# Patient Record
Sex: FEMALE | Race: WHITE | HISPANIC OR LATINO | Employment: FULL TIME | URBAN - METROPOLITAN AREA
[De-identification: names, ages, dates, MRNs, and addresses within clinical notes are randomized per-mention and may not be internally consistent; named-entity substitution may affect disease eponyms.]

---

## 2018-02-20 ENCOUNTER — OFFICE VISIT - HEALTHEAST (OUTPATIENT)
Dept: FAMILY MEDICINE | Facility: CLINIC | Age: 23
End: 2018-02-20

## 2018-02-20 DIAGNOSIS — N92.6 IRREGULAR MENSTRUAL BLEEDING: ICD-10-CM

## 2018-02-20 DIAGNOSIS — Z00.00 ROUTINE GENERAL MEDICAL EXAMINATION AT A HEALTH CARE FACILITY: ICD-10-CM

## 2018-02-20 DIAGNOSIS — Z83.3 FAMILY HISTORY OF DIABETES MELLITUS IN FATHER: ICD-10-CM

## 2018-02-20 ASSESSMENT — MIFFLIN-ST. JEOR: SCORE: 1783.27

## 2018-05-02 ENCOUNTER — COMMUNICATION - HEALTHEAST (OUTPATIENT)
Dept: FAMILY MEDICINE | Facility: CLINIC | Age: 23
End: 2018-05-02

## 2018-05-02 DIAGNOSIS — Z30.9 CONTRACEPTION MANAGEMENT: ICD-10-CM

## 2018-08-06 ENCOUNTER — COMMUNICATION - HEALTHEAST (OUTPATIENT)
Dept: FAMILY MEDICINE | Facility: CLINIC | Age: 23
End: 2018-08-06

## 2018-08-06 DIAGNOSIS — Z30.9 CONTRACEPTION MANAGEMENT: ICD-10-CM

## 2018-08-16 ENCOUNTER — COMMUNICATION - HEALTHEAST (OUTPATIENT)
Dept: SCHEDULING | Facility: CLINIC | Age: 23
End: 2018-08-16

## 2018-08-16 DIAGNOSIS — Z30.9 CONTRACEPTION MANAGEMENT: ICD-10-CM

## 2018-09-28 ENCOUNTER — RECORDS - HEALTHEAST (OUTPATIENT)
Dept: ADMINISTRATIVE | Facility: OTHER | Age: 23
End: 2018-09-28

## 2018-09-28 LAB
CHLAMYDIA_EXT- HISTORICAL: NEGATIVE
SPECIMEN DESCRIPTION_EXT (HISTORICAL CONVERSION): NORMAL

## 2018-12-10 ENCOUNTER — OFFICE VISIT - HEALTHEAST (OUTPATIENT)
Dept: FAMILY MEDICINE | Facility: CLINIC | Age: 23
End: 2018-12-10

## 2018-12-10 DIAGNOSIS — B96.89 BACTERIAL VAGINOSIS: ICD-10-CM

## 2018-12-10 DIAGNOSIS — A60.04 HERPES SIMPLEX VULVOVAGINITIS: ICD-10-CM

## 2018-12-10 DIAGNOSIS — R30.0 DYSURIA: ICD-10-CM

## 2018-12-10 DIAGNOSIS — N76.0 BACTERIAL VAGINOSIS: ICD-10-CM

## 2018-12-10 LAB
ALBUMIN UR-MCNC: ABNORMAL MG/DL
APPEARANCE UR: CLEAR
BACTERIA #/AREA URNS HPF: ABNORMAL HPF
BILIRUB UR QL STRIP: NEGATIVE
CLUE CELLS: ABNORMAL
COLOR UR AUTO: YELLOW
GLUCOSE UR STRIP-MCNC: NEGATIVE MG/DL
HGB UR QL STRIP: ABNORMAL
KETONES UR STRIP-MCNC: NEGATIVE MG/DL
LEUKOCYTE ESTERASE UR QL STRIP: ABNORMAL
NITRATE UR QL: NEGATIVE
PH UR STRIP: 6.5 [PH] (ref 5–8)
RBC #/AREA URNS AUTO: ABNORMAL HPF
SP GR UR STRIP: >=1.03 (ref 1–1.03)
SQUAMOUS #/AREA URNS AUTO: ABNORMAL LPF
TRICHOMONAS, WET PREP: ABNORMAL
UROBILINOGEN UR STRIP-ACNC: ABNORMAL
WBC #/AREA URNS AUTO: ABNORMAL HPF
YEAST, WET PREP: ABNORMAL

## 2018-12-11 LAB
BACTERIA SPEC CULT: NO GROWTH
HSV SPECIMEN: ABNORMAL
HSV1 DNA SPEC QL NAA+PROBE: POSITIVE
HSV2 DNA SPEC QL NAA+PROBE: NEGATIVE

## 2018-12-12 LAB
C TRACH DNA SPEC QL PROBE+SIG AMP: NEGATIVE
N GONORRHOEA DNA SPEC QL NAA+PROBE: NEGATIVE

## 2018-12-28 ENCOUNTER — COMMUNICATION - HEALTHEAST (OUTPATIENT)
Dept: FAMILY MEDICINE | Facility: CLINIC | Age: 23
End: 2018-12-28

## 2018-12-28 DIAGNOSIS — Z30.9 CONTRACEPTION MANAGEMENT: ICD-10-CM

## 2019-03-28 ENCOUNTER — COMMUNICATION - HEALTHEAST (OUTPATIENT)
Dept: FAMILY MEDICINE | Facility: CLINIC | Age: 24
End: 2019-03-28

## 2019-03-28 DIAGNOSIS — Z30.9 CONTRACEPTION MANAGEMENT: ICD-10-CM

## 2019-04-02 ENCOUNTER — COMMUNICATION - HEALTHEAST (OUTPATIENT)
Dept: FAMILY MEDICINE | Facility: CLINIC | Age: 24
End: 2019-04-02

## 2019-04-02 DIAGNOSIS — Z30.9 CONTRACEPTION MANAGEMENT: ICD-10-CM

## 2019-04-10 ENCOUNTER — RECORDS - HEALTHEAST (OUTPATIENT)
Dept: ADMINISTRATIVE | Facility: OTHER | Age: 24
End: 2019-04-10

## 2019-04-10 LAB
CHLAMYDIA_EXT- HISTORICAL: NEGATIVE
PAP SMEAR - HIM PATIENT REPORTED: NORMAL
SPECIMEN DESCRIPTION_EXT (HISTORICAL CONVERSION): NORMAL

## 2019-05-07 ENCOUNTER — OFFICE VISIT - HEALTHEAST (OUTPATIENT)
Dept: FAMILY MEDICINE | Facility: CLINIC | Age: 24
End: 2019-05-07

## 2019-05-07 DIAGNOSIS — N94.6 DYSMENORRHEA: ICD-10-CM

## 2019-05-07 DIAGNOSIS — Z30.9 CONTRACEPTION MANAGEMENT: ICD-10-CM

## 2019-05-07 DIAGNOSIS — Z13.1 SCREENING FOR DIABETES MELLITUS: ICD-10-CM

## 2019-05-07 DIAGNOSIS — Z11.3 SCREEN FOR STD (SEXUALLY TRANSMITTED DISEASE): ICD-10-CM

## 2019-05-07 DIAGNOSIS — Z00.00 ROUTINE GENERAL MEDICAL EXAMINATION AT A HEALTH CARE FACILITY: ICD-10-CM

## 2019-05-07 LAB — HBA1C MFR BLD: 5.1 % (ref 3.5–6)

## 2019-05-07 ASSESSMENT — MIFFLIN-ST. JEOR: SCORE: 1869.45

## 2019-05-08 LAB
C TRACH DNA SPEC QL PROBE+SIG AMP: NEGATIVE
N GONORRHOEA DNA SPEC QL NAA+PROBE: NEGATIVE

## 2019-05-09 ENCOUNTER — RECORDS - HEALTHEAST (OUTPATIENT)
Dept: ADMINISTRATIVE | Facility: OTHER | Age: 24
End: 2019-05-09

## 2019-05-13 ENCOUNTER — RECORDS - HEALTHEAST (OUTPATIENT)
Dept: HEALTH INFORMATION MANAGEMENT | Facility: CLINIC | Age: 24
End: 2019-05-13

## 2019-10-22 ENCOUNTER — COMMUNICATION - HEALTHEAST (OUTPATIENT)
Dept: FAMILY MEDICINE | Facility: CLINIC | Age: 24
End: 2019-10-22

## 2019-11-11 ENCOUNTER — OFFICE VISIT - HEALTHEAST (OUTPATIENT)
Dept: FAMILY MEDICINE | Facility: CLINIC | Age: 24
End: 2019-11-11

## 2019-11-11 DIAGNOSIS — F33.1 MODERATE EPISODE OF RECURRENT MAJOR DEPRESSIVE DISORDER (H): ICD-10-CM

## 2019-11-11 DIAGNOSIS — F32.81 PMDD (PREMENSTRUAL DYSPHORIC DISORDER): ICD-10-CM

## 2019-11-11 ASSESSMENT — ANXIETY QUESTIONNAIRES
GAD7 TOTAL SCORE: 15
6. BECOMING EASILY ANNOYED OR IRRITABLE: MORE THAN HALF THE DAYS
3. WORRYING TOO MUCH ABOUT DIFFERENT THINGS: NEARLY EVERY DAY
5. BEING SO RESTLESS THAT IT IS HARD TO SIT STILL: MORE THAN HALF THE DAYS
IF YOU CHECKED OFF ANY PROBLEMS ON THIS QUESTIONNAIRE, HOW DIFFICULT HAVE THESE PROBLEMS MADE IT FOR YOU TO DO YOUR WORK, TAKE CARE OF THINGS AT HOME, OR GET ALONG WITH OTHER PEOPLE: VERY DIFFICULT
7. FEELING AFRAID AS IF SOMETHING AWFUL MIGHT HAPPEN: SEVERAL DAYS
1. FEELING NERVOUS, ANXIOUS, OR ON EDGE: MORE THAN HALF THE DAYS
4. TROUBLE RELAXING: NEARLY EVERY DAY
2. NOT BEING ABLE TO STOP OR CONTROL WORRYING: MORE THAN HALF THE DAYS

## 2019-11-11 ASSESSMENT — PATIENT HEALTH QUESTIONNAIRE - PHQ9: SUM OF ALL RESPONSES TO PHQ QUESTIONS 1-9: 24

## 2019-12-03 ENCOUNTER — COMMUNICATION - HEALTHEAST (OUTPATIENT)
Dept: FAMILY MEDICINE | Facility: CLINIC | Age: 24
End: 2019-12-03

## 2019-12-03 DIAGNOSIS — F33.1 MODERATE EPISODE OF RECURRENT MAJOR DEPRESSIVE DISORDER (H): ICD-10-CM

## 2019-12-18 ENCOUNTER — OFFICE VISIT - HEALTHEAST (OUTPATIENT)
Dept: FAMILY MEDICINE | Facility: CLINIC | Age: 24
End: 2019-12-18

## 2019-12-18 DIAGNOSIS — F33.1 MODERATE EPISODE OF RECURRENT MAJOR DEPRESSIVE DISORDER (H): ICD-10-CM

## 2019-12-18 DIAGNOSIS — F32.81 PMDD (PREMENSTRUAL DYSPHORIC DISORDER): ICD-10-CM

## 2019-12-18 ASSESSMENT — ANXIETY QUESTIONNAIRES
4. TROUBLE RELAXING: NOT AT ALL
5. BEING SO RESTLESS THAT IT IS HARD TO SIT STILL: SEVERAL DAYS
IF YOU CHECKED OFF ANY PROBLEMS ON THIS QUESTIONNAIRE, HOW DIFFICULT HAVE THESE PROBLEMS MADE IT FOR YOU TO DO YOUR WORK, TAKE CARE OF THINGS AT HOME, OR GET ALONG WITH OTHER PEOPLE: SOMEWHAT DIFFICULT
6. BECOMING EASILY ANNOYED OR IRRITABLE: NOT AT ALL
GAD7 TOTAL SCORE: 1
7. FEELING AFRAID AS IF SOMETHING AWFUL MIGHT HAPPEN: NOT AT ALL
2. NOT BEING ABLE TO STOP OR CONTROL WORRYING: NOT AT ALL
1. FEELING NERVOUS, ANXIOUS, OR ON EDGE: NOT AT ALL
3. WORRYING TOO MUCH ABOUT DIFFERENT THINGS: NOT AT ALL

## 2019-12-18 ASSESSMENT — PATIENT HEALTH QUESTIONNAIRE - PHQ9: SUM OF ALL RESPONSES TO PHQ QUESTIONS 1-9: 3

## 2020-01-14 ENCOUNTER — COMMUNICATION - HEALTHEAST (OUTPATIENT)
Dept: FAMILY MEDICINE | Facility: CLINIC | Age: 25
End: 2020-01-14

## 2020-01-14 DIAGNOSIS — Z30.9 CONTRACEPTION MANAGEMENT: ICD-10-CM

## 2020-04-13 ENCOUNTER — COMMUNICATION - HEALTHEAST (OUTPATIENT)
Dept: FAMILY MEDICINE | Facility: CLINIC | Age: 25
End: 2020-04-13

## 2020-04-13 DIAGNOSIS — Z30.9 CONTRACEPTION MANAGEMENT: ICD-10-CM

## 2020-05-15 ENCOUNTER — VIRTUAL VISIT (OUTPATIENT)
Dept: FAMILY MEDICINE | Facility: OTHER | Age: 25
End: 2020-05-15

## 2020-05-15 ENCOUNTER — AMBULATORY - HEALTHEAST (OUTPATIENT)
Dept: FAMILY MEDICINE | Facility: CLINIC | Age: 25
End: 2020-05-15

## 2020-05-15 ENCOUNTER — OFFICE VISIT - HEALTHEAST (OUTPATIENT)
Dept: INTERNAL MEDICINE | Facility: CLINIC | Age: 25
End: 2020-05-15

## 2020-05-15 DIAGNOSIS — Z20.822 SUSPECTED COVID-19 VIRUS INFECTION: ICD-10-CM

## 2020-05-15 NOTE — PROGRESS NOTES
"Date: 05/15/2020 12:52:56  Clinician: Savana Camacho  Clinician NPI: [id number]  Patient: Abbea Toro  Patient : 1995  Patient Address: [address] [place], [place] [id number]  Patient Phone: [phone]  Visit Protocol: URI  Patient Summary:  Abeba is a 24 year old ( : 1995 ) female who initiated a Visit for COVID-19 (Coronavirus) evaluation and screening. When asked the question \"Please sign me up to receive news, health information and promotions. \", Abeba responded \"No\".    Abeba states her symptoms started 1-2 days ago.   Her symptoms consist of a cough, malaise, and myalgia. Abeba also feels feverish but was unable to measure her temperature.   Symptom details   Cough: Abeba coughs a few times an hour and her cough is more bothersome at night. Phlegm does not come into her throat when she coughs. She does not believe her cough is caused by post-nasal drip.    Abeba denies having nasal congestion, vomiting, nausea, teeth pain, ageusia, diarrhea, facial pain or pressure, chills, ear pain, headache, rhinitis, anosmia, sore throat, and wheezing. She also denies taking antibiotic medication for the symptoms and having recent facial or sinus surgery in the past 60 days. She is not experiencing dyspnea.   Precipitating events  She has not recently been exposed to someone with influenza. Abeba has not been in close contact with any high risk individuals.   Pertinent COVID-19 (Coronavirus) information  In the past 14 days, Abeba has not worked in a congregate living setting.   She does not work or volunteer as healthcare worker or a  and does not work or volunteer in a healthcare facility.   Abeba also has not lived in a congregate living setting in the past 14 days. She lives with a healthcare worker.   Abeba has not had a close contact with a laboratory-confirmed COVID-19 patient within 14 days of symptom onset.   Pertinent medical history  Abeba " typically gets a yeast infection when she takes antibiotics. She is not sure if she has used fluconazole (Diflucan) to treat previous yeast infections.   Abeba does not need a return to work/school note.   Weight: 240 lbs   Abeba does not smoke or use smokeless tobacco.   She denies pregnancy and denies breastfeeding. Her last period was over a month ago.   Additional information as reported by the patient (free text): The person who I am living with has all the symptoms and has been exposed to several people with COVID.   Weight: 240 lbs    MEDICATIONS: Lexapro oral, sertraline oral, ALLERGIES: NKDA  Clinician Response:  Dear Abeba,   Dear Abeba  Your symptoms show that you may have coronavirus (COVID-19). This illness can cause fever, cough and trouble breathing. Many people get a mild case and get better on their own. Some people can get very sick.  What should I do?  We would like to test you for this virus. This will be a curbside test done outside the clinic.  Please call [phone] to schedule your visit. Explain that you were referred by [facility] to have a COVID-19 test. Be ready to share your [facility] visit ID number.  Starting now:  Stay at least 6 feet away from others. (If someone will drive you to your test, stay in the backseat, as far away from the  as you can.)   Don't go to work, school or anywhere else. When it's time for your test, go straight to the testing site. Don't make any stops on the way there or back.   Wash your hands and face often. Use soap and water.   Cover your mouth and nose with a mask, tissue or washcloth.   Don't touch anyone. No hugging, kissing or handshakes.  While at home   Stay home and away from others (self-isolate) until:  You've had no fever---and no medicine that reduces fever---for 3 full days (72 hours). And...  Your other symptoms have gotten better. For example, your cough or breathing has improved. And...  At least 10 days have passed since your  "symptoms started.  During this time:  Stay in your own room (and use your own bathroom), if you can.  Don't go to work, school or anywhere else.  Stay away from others in your home. No hugging, kissing or shaking hands.  Don't let anyone visit.  Cover your mouth and nose with a mask, tissue or washcloth to avoid spreading germs.  Clean \"high touch\" surfaces often (doorknobs, counters, handles, etc.). Use a household cleaning spray or wipes.  Wash your hands and face often. Use soap and water.  How can I take care of myself?  1. Get lots of rest. Drink extra fluids (unless your doctor has told you not to).  2. Take Tylenol (acetaminophen) for fever or pain. If you have liver or kidney problems, ask your family doctor if it's okay to take Tylenol.  Adults can take either:   650 mg (two 325 mg pills) every 4 to 6 hours, or...  1,000 mg (two 500 mg pills) every 8 hours as needed.   Note: Don't take more than 3,000 mg in one day.   Acetaminophen is found in many medicines (both prescribed and over-the-counter medicines). Read all labels to be sure you don't take too much.   For children, check the Tylenol bottle for the right dose. The dose is based on the child's age or weight.  3. If you have other health problems (like cancer, heart failure, an organ transplant or severe kidney disease): Call your specialty clinic if you don't feel better in the next 2 days.  4. Know when to call 911: If your breathing is so bad that it keeps you from doing normal activities, call 911 or go to the emergency room. Tell them that you've been staying home and may have COVID-19.  5. Sign up for [employer]. We know it's scary to hear that you might have COVID-19. We want to track your symptoms to make sure you're okay over the next 2 weeks. Please look for an email from [employer]---this is a free, online program that we'll use to keep in touch. To sign up, follow the link in the email. Learn more at [email or web address].  " 6. The following will serve as your written order for this Covid Test ordered by me for the indication of suspected Covid [Z20.828]: The test will be ordered in [employer] our electronic health record after you are scheduled and will show as ordered and authorized by Sergio Greene MD   Order: Covid-19 (Coronavirus) PCR for SYMPTOMATIC testing from [facility]  Where can I get more information?  To learn more about COVID-19 and how to care for yourself at home, please visit the CDC website at https://www.cdc.gov/coronavirus/2019-ncov/about/steps-when-sick.html.  For more about your care at [facility], please visit [email or web address]  If you'd like to be part of a COVID-19 clinical trial (research study) at the [facility], go to [email or web address] for details.    Diagnosis: Cough  Diagnosis ICD: R05

## 2020-05-17 ENCOUNTER — COMMUNICATION - HEALTHEAST (OUTPATIENT)
Dept: INTERNAL MEDICINE | Facility: CLINIC | Age: 25
End: 2020-05-17

## 2020-05-20 ENCOUNTER — AMBULATORY - HEALTHEAST (OUTPATIENT)
Dept: FAMILY MEDICINE | Facility: CLINIC | Age: 25
End: 2020-05-20

## 2020-05-20 ENCOUNTER — COMMUNICATION - HEALTHEAST (OUTPATIENT)
Dept: SCHEDULING | Facility: CLINIC | Age: 25
End: 2020-05-20

## 2020-05-20 ENCOUNTER — OFFICE VISIT - HEALTHEAST (OUTPATIENT)
Dept: FAMILY MEDICINE | Facility: CLINIC | Age: 25
End: 2020-05-20

## 2020-05-20 ENCOUNTER — OFFICE VISIT - HEALTHEAST (OUTPATIENT)
Dept: INTERNAL MEDICINE | Facility: CLINIC | Age: 25
End: 2020-05-20

## 2020-05-20 ENCOUNTER — TELEPHONE (OUTPATIENT)
Dept: URGENT CARE | Facility: CLINIC | Age: 25
End: 2020-05-20

## 2020-05-20 ENCOUNTER — COMMUNICATION - HEALTHEAST (OUTPATIENT)
Dept: INTERNAL MEDICINE | Facility: CLINIC | Age: 25
End: 2020-05-20

## 2020-05-20 DIAGNOSIS — Z20.828 EXPOSURE TO SARS-ASSOCIATED CORONAVIRUS: ICD-10-CM

## 2020-05-20 DIAGNOSIS — Z20.822 SUSPECTED COVID-19 VIRUS INFECTION: ICD-10-CM

## 2020-05-20 DIAGNOSIS — Z20.822 EXPOSURE TO COVID-19 VIRUS: Primary | ICD-10-CM

## 2020-05-20 DIAGNOSIS — R05.9 COUGH: ICD-10-CM

## 2020-05-20 NOTE — TELEPHONE ENCOUNTER
Abeba Toro is being monitored on the [facility] GetWell Loop for patients with symptoms of COVID-19.    I have referred this patient to an [facility] for evaluation. When the patient calls, please allow them to schedule at urgent care.    Reason for referral: worsening SOB    Abeba's symtoms began on 5/14, was seen virtually on 5/15, PCR test on 5/15 was negative.  Sx history: 5/14 got dry cough.  Starting 5/18, also started having throat pain and difficulty swallowing, SOB, and diarrhea.  The SOB affects her when walking up stairs (3rd floor apartment), doing dishes, or even just stretching, it is difficult for her to catch her breath, feels she has to breath faster through her mouth.    Pt has no history of asthma or allergies, no other medical conditions.    Lives with partner, he was exposed at work and his sx started 5/13, his PCR test on 5/16 was positive, he continues to have symptoms but managed at home.    We ordered a second PCR test for her and recommended she be seen in urgent care to assess worsening SOB.  Patient ok with this plan, was given [address] [place] [address] to call and schedule.      Guera Cornejo, MS4  Working with Dr. Desmond Carlisle MD  5/20/2020 10:43 AM

## 2020-05-20 NOTE — TELEPHONE ENCOUNTER
I reviewed the telephone visit encounter and discussed the patient with the medical student and agree with the provider's assessment and plan of care as documented.  Would recommend retesting due to continued symptoms.  Desmond Carlisle MD

## 2020-05-22 ENCOUNTER — COMMUNICATION - HEALTHEAST (OUTPATIENT)
Dept: INTERNAL MEDICINE | Facility: CLINIC | Age: 25
End: 2020-05-22

## 2020-09-06 ENCOUNTER — COMMUNICATION - HEALTHEAST (OUTPATIENT)
Dept: FAMILY MEDICINE | Facility: CLINIC | Age: 25
End: 2020-09-06

## 2020-09-06 DIAGNOSIS — F33.1 MODERATE EPISODE OF RECURRENT MAJOR DEPRESSIVE DISORDER (H): ICD-10-CM

## 2020-09-30 DIAGNOSIS — Z11.59 SCREENING FOR VIRAL DISEASE: ICD-10-CM

## 2020-12-22 ENCOUNTER — COMMUNICATION - HEALTHEAST (OUTPATIENT)
Dept: FAMILY MEDICINE | Facility: CLINIC | Age: 25
End: 2020-12-22

## 2020-12-22 DIAGNOSIS — Z30.9 CONTRACEPTION MANAGEMENT: ICD-10-CM

## 2021-02-25 ENCOUNTER — OFFICE VISIT - HEALTHEAST (OUTPATIENT)
Dept: FAMILY MEDICINE | Facility: CLINIC | Age: 26
End: 2021-02-25

## 2021-02-25 DIAGNOSIS — E66.01 MORBID OBESITY (H): ICD-10-CM

## 2021-02-25 DIAGNOSIS — Z00.00 ROUTINE GENERAL MEDICAL EXAMINATION AT A HEALTH CARE FACILITY: ICD-10-CM

## 2021-02-25 DIAGNOSIS — F33.1 MODERATE EPISODE OF RECURRENT MAJOR DEPRESSIVE DISORDER (H): ICD-10-CM

## 2021-02-25 DIAGNOSIS — Z30.9 CONTRACEPTION MANAGEMENT: ICD-10-CM

## 2021-02-25 LAB — HBA1C MFR BLD: 5.3 %

## 2021-02-25 RX ORDER — LEVONORGESTREL AND ETHINYL ESTRADIOL 0.15-0.03
1 KIT ORAL DAILY
Qty: 3 PACKAGE | Refills: 3 | Status: SHIPPED | OUTPATIENT
Start: 2021-02-25 | End: 2022-01-09

## 2021-02-25 ASSESSMENT — ANXIETY QUESTIONNAIRES
2. NOT BEING ABLE TO STOP OR CONTROL WORRYING: NOT AT ALL
6. BECOMING EASILY ANNOYED OR IRRITABLE: MORE THAN HALF THE DAYS
7. FEELING AFRAID AS IF SOMETHING AWFUL MIGHT HAPPEN: MORE THAN HALF THE DAYS
5. BEING SO RESTLESS THAT IT IS HARD TO SIT STILL: SEVERAL DAYS
1. FEELING NERVOUS, ANXIOUS, OR ON EDGE: MORE THAN HALF THE DAYS
IF YOU CHECKED OFF ANY PROBLEMS ON THIS QUESTIONNAIRE, HOW DIFFICULT HAVE THESE PROBLEMS MADE IT FOR YOU TO DO YOUR WORK, TAKE CARE OF THINGS AT HOME, OR GET ALONG WITH OTHER PEOPLE: SOMEWHAT DIFFICULT
4. TROUBLE RELAXING: NEARLY EVERY DAY
3. WORRYING TOO MUCH ABOUT DIFFERENT THINGS: MORE THAN HALF THE DAYS
GAD7 TOTAL SCORE: 12

## 2021-02-25 ASSESSMENT — PATIENT HEALTH QUESTIONNAIRE - PHQ9: SUM OF ALL RESPONSES TO PHQ QUESTIONS 1-9: 23

## 2021-02-25 ASSESSMENT — MIFFLIN-ST. JEOR: SCORE: 2005.53

## 2021-04-05 ENCOUNTER — OFFICE VISIT - HEALTHEAST (OUTPATIENT)
Dept: FAMILY MEDICINE | Facility: CLINIC | Age: 26
End: 2021-04-05

## 2021-04-05 DIAGNOSIS — F33.1 MODERATE EPISODE OF RECURRENT MAJOR DEPRESSIVE DISORDER (H): ICD-10-CM

## 2021-04-05 ASSESSMENT — PATIENT HEALTH QUESTIONNAIRE - PHQ9: SUM OF ALL RESPONSES TO PHQ QUESTIONS 1-9: 14

## 2021-05-03 ENCOUNTER — OFFICE VISIT - HEALTHEAST (OUTPATIENT)
Dept: FAMILY MEDICINE | Facility: CLINIC | Age: 26
End: 2021-05-03

## 2021-05-03 DIAGNOSIS — F33.1 MODERATE EPISODE OF RECURRENT MAJOR DEPRESSIVE DISORDER (H): ICD-10-CM

## 2021-05-03 RX ORDER — BUPROPION HYDROCHLORIDE 300 MG/1
300 TABLET ORAL DAILY
Qty: 90 TABLET | Refills: 3 | Status: SHIPPED | OUTPATIENT
Start: 2021-05-03 | End: 2022-01-09

## 2021-05-03 ASSESSMENT — PATIENT HEALTH QUESTIONNAIRE - PHQ9: SUM OF ALL RESPONSES TO PHQ QUESTIONS 1-9: 2

## 2021-05-19 ENCOUNTER — COMMUNICATION - HEALTHEAST (OUTPATIENT)
Dept: FAMILY MEDICINE | Facility: CLINIC | Age: 26
End: 2021-05-19

## 2021-05-19 DIAGNOSIS — F33.1 MODERATE EPISODE OF RECURRENT MAJOR DEPRESSIVE DISORDER (H): ICD-10-CM

## 2021-05-26 ASSESSMENT — PATIENT HEALTH QUESTIONNAIRE - PHQ9
SUM OF ALL RESPONSES TO PHQ QUESTIONS 1-9: 3
SUM OF ALL RESPONSES TO PHQ QUESTIONS 1-9: 24

## 2021-05-27 VITALS
RESPIRATION RATE: 14 BRPM | HEART RATE: 68 BPM | DIASTOLIC BLOOD PRESSURE: 78 MMHG | OXYGEN SATURATION: 98 % | SYSTOLIC BLOOD PRESSURE: 120 MMHG | TEMPERATURE: 98.5 F

## 2021-05-27 ASSESSMENT — PATIENT HEALTH QUESTIONNAIRE - PHQ9
SUM OF ALL RESPONSES TO PHQ QUESTIONS 1-9: 23
SUM OF ALL RESPONSES TO PHQ QUESTIONS 1-9: 2
SUM OF ALL RESPONSES TO PHQ QUESTIONS 1-9: 14

## 2021-05-27 NOTE — TELEPHONE ENCOUNTER
RN cannot approve Refill Request    RN can NOT refill this medication PCP messaged that patient is overdue for Office Visit and Physical .       Lilian Saucedo, [facility] Triage/Med Refill 4/3/2019    Requested Prescriptions   Pending Prescriptions Disp Refills     levonorgestrel-ethinyl estradiol (AVIANE,ALESSE,LESSINA) 0.1-20 mg-mcg per tablet 1 Package 0     Sig: Take 1 tablet by mouth daily.    Oral Contraceptives Protocol Failed - 4/2/2019 11:17 AM       Failed - Visit with PCP or prescribing provider visit in last 12 months     Last office visit with prescriber/PCP: Visit date not found OR same dept: Visit date not found OR same specialty: Visit date not found  Last physical: Visit date not found Last MTM visit: Visit date not found   Next visit within 3 mo: Visit date not found  Next physical within 3 mo: Visit date not found  Prescriber OR PCP: Laxmi Velazco MD  Last diagnosis associated with med order: 1. Contraception management  - levonorgestrel-ethinyl estradiol (AVIANE,ALESSE,LESSINA) 0.1-20 mg-mcg per tablet; Take 1 tablet by mouth daily.  Dispense: 1 Package; Refill: 0    If protocol passes may refill for 12 months if within 3 months of last provider visit (or a total of 15 months).

## 2021-05-27 NOTE — TELEPHONE ENCOUNTER
RN cannot approve Refill Request    RN can NOT refill this medication Protocol failed and NO refill given. Last office visit: Visit date not found Last Physical: 2/20/2018 Last MTM visit: Visit date not found Last visit same specialty: Visit date not found.  Next visit within 3 mo: Visit date not found  Next physical within 3 mo: Visit date not found      Mitzi Jorge, [facility] Triage/Med Refill 3/28/2019    Requested Prescriptions   Pending Prescriptions Disp Refills     levonorgestrel-ethinyl estradiol (AVIANE,ALESSE,LESSINA) 0.1-20 mg-mcg per tablet 3 Package 0     Sig: Take 1 tablet by mouth daily.    Oral Contraceptives Protocol Failed - 3/28/2019 10:44 AM       Failed - Visit with PCP or prescribing provider visit in last 12 months     Last office visit with prescriber/PCP: Visit date not found OR same dept: Visit date not found OR same specialty: Visit date not found  Last physical: 2/20/2018 Last MTM visit: Visit date not found   Next visit within 3 mo: Visit date not found  Next physical within 3 mo: Visit date not found  Prescriber OR PCP: Allison Ramsey CNP  Last diagnosis associated with med order: 1. Contraception management  - levonorgestrel-ethinyl estradiol (AVIANE,ALESSE,LESSINA) 0.1-20 mg-mcg per tablet; Take 1 tablet by mouth daily.  Dispense: 3 Package; Refill: 0    If protocol passes may refill for 12 months if within 3 months of last provider visit (or a total of 15 months).

## 2021-05-28 ASSESSMENT — ANXIETY QUESTIONNAIRES
GAD7 TOTAL SCORE: 12
GAD7 TOTAL SCORE: 15
GAD7 TOTAL SCORE: 1

## 2021-05-28 NOTE — PROGRESS NOTES
FEMALE PREVENTIVE EXAM    Assessment & Plan   1. Routine general medical examination at a health care facility  Non-fasting diabetes screen, she does have a family history in her father.  Update Tdap. Per her report pap smear completed last month and we will request records of this today.    - Tdap vaccine,  6yo or older,  IM  - JI RED    2. Dysmenorrhea  Increasing discomfort last month.  Typically experiences menstrual cramps though not to that severity.  Discussed the possibility of ovarian cyst.  She declines pelvic exam today as she recently had this completed.  No tenderness on abdominal exam.  Will have her monitor her next cycle and if pain continues check pelvic ultrasound.  She will send a mychart and let me know.  Recheck GC today as well.     3. Contraception management  Denies risk factors for use, refilled for one year.    - levonorgestrel-ethinyl estradiol (AVIANE,ALESSE,LESSINA) 0.1-20 mg-mcg per tablet; Take 1 tablet by mouth daily.  Dispense: 3 Package; Refill: 3    4. Screen for STD (sexually transmitted disease)  - Chlamydia trachomatis & Neisseria gonorrhoeae, Amplified Detection    5. Screening for diabetes mellitus  - Glycosylated Hemoglobin A1c      Recommend repeat pap smear if normal every three years, Recommended adequate calcium intake/osteoporosis prevention, Discussed breast cancer screening guidelines, Discussed safe sex practices and Discussed diet, including moderation of portions sizes, avoiding eating out and fast food and increase in fruits and vegetables    Allison Ramsey, DAMIEN    Subjective:   Chief Complaint:  Establish Care and Annual Exam (not fasting - pap)    HPI:  Abeba Toro is a 23 y.o. female who presents for routine physical exam.  She is here today for physical and pap.     She states menstrual cycles typically involve somewhat painful cramps, however this past cycle she was very uncomfortable.  Lasted for 3-4 days but no withdrawal bleed experienced.  Does take  OCP.  Allows for monthly withdrawal bleed as she experienced spotting with taking continuously.     Was seen one month ago at [facility] and states pap smear was done as well as GC. One new partner since that time.  Negative urine pregnancy.      OB/Gyn History:  Menstrual history: see HPI  Date of previous pap: none  History of abnormal pap: none  Current Contraceptive method: OCP    Preventive Health:  Reviewed and recommended screening and treatment recommendations:  Immunizations: due for tdap    Health Habits:    Exercise: no.  Calcium intake/Osteoporosis prevention: no    PMH:   There is no problem list on file for this patient.      Past Medical History:   Diagnosis Date     Medical history reviewed with no changes        Current Medications: Reviewed   Current Outpatient Medications on File Prior to Visit   Medication Sig Dispense Refill     levonorgestrel-ethinyl estradiol (AVIANE,ALESSE,LESSINA) 0.1-20 mg-mcg per tablet Take 1 tablet by mouth daily. 1 Package 0     valACYclovir (VALTREX) 500 MG tablet Take 1 tablet (500 mg) by mouth 2 (two) times a day for 3 days as needed for outbreak. Start ASAP after onset of symptoms. 30 tablet 0     No current facility-administered medications on file prior to visit.        Allergies:  Reviewed  has No Known Allergies.    Social History:  Social History     Occupational History     Not on file   Tobacco Use     Smoking status: Never Smoker     Smokeless tobacco: Never Used   Substance and Sexual Activity     Alcohol use: Yes     Frequency: 2-4 times a month     Drinks per session: 3 or 4     Drug use: No     Sexual activity: Yes     Partners: Male     Birth control/protection: Pill, Condom       Family History:   Family History   Problem Relation Age of Onset     Diabetes Father      Hypertension Father      No Medical Problems Mother          Review of Systems:  Complete head to toe review of systems is otherwise negative except as above.    Objective:    BP  "104/66 (Patient Site: Left Arm, Patient Position: Sitting, Cuff Size: Adult Large)   Pulse 80   Ht 5' 7.5\" (1.715 m)   LMP 04/25/2019 (Exact Date)   Breastfeeding? No   BMI 38.42 kg/m      GENERAL: Alert, well-appearing female .   PSYCH: Pleasant mood, affect appropriate.    SKIN: No atypical lesions  EYES: Conjunctiva pink, sclera white, no exudates. CRISTHIAN.  EOMs intact.   EARS: TMs pearly grey, no bulging, redness, retraction.   MOUTH: Pharynx moist, pink without exudate. No tonsillar enlargement  NECK: No lymphadenopathy. Thyroid borders smooth without enlargement, nodules.   CV: Regular rate and rhythm without murmurs, rubs or gallops.  RESP: Lung sounds clear  ABDOMEN: BS+. Abdomen soft.  No organomegaly  BREASTS: Breasts symmetric, no dimpling, masses or skin discolorations seen. Areolas and nipples symmetric without discharge. On palpation, breast tissue supple and nontender. No masses or nodules. Axillary and epitrochlear lymph nodes nonpalpable.   PV :  No edema        "

## 2021-05-28 NOTE — PATIENT INSTRUCTIONS - HE
Recommendations from today's visit                                                       1. Please let me know if intense menstrual cramps continue and we will check a pelvic ultrasound.      2. I will follow up via [employer] with the results of your STD screen and bloodwork today.      Next appointment: one year    To reschedule your appointment, please call the clinic directly at [phone].   It was a pleasure seeing you today! I look forward to seeing you again.

## 2021-06-01 VITALS — BODY MASS INDEX: 33 KG/M2 | HEIGHT: 68 IN | WEIGHT: 217.75 LBS

## 2021-06-02 VITALS — WEIGHT: 249 LBS | BODY MASS INDEX: 38.42 KG/M2

## 2021-06-03 VITALS — BODY MASS INDEX: 35.88 KG/M2 | HEIGHT: 68 IN | WEIGHT: 236.75 LBS

## 2021-06-03 NOTE — TELEPHONE ENCOUNTER
Refill Approved    Rx renewed per Medication Renewal Policy. Medication was last renewed on 11/11/2019 for 30/1  Last OV 11/11/2019  Lupis Mcallister, [facility] Triage/Med Refill 12/4/2019     Requested Prescriptions   Pending Prescriptions Disp Refills     sertraline (ZOLOFT) 50 MG tablet [Pharmacy Med Name: SERTRALINE HCL 50 MG TABLET] 30 tablet 1     Sig: TAKE 1 TABLET BY MOUTH EVERY DAY       [facility] Refill Protocol  Passed - 12/3/2019  9:32 AM        Passed - PCP or prescribing provider visit in last year     Last office visit with prescriber/PCP: 11/11/2019 Allison Ramsey CNP OR same dept: 11/11/2019 Allison Ramsey CNP OR same specialty: 11/11/2019 Allison Ramsey CNP  Last physical: 5/7/2019 Last MTM visit: Visit date not found   Next visit within 3 mo: Visit date not found  Next physical within 3 mo: Visit date not found  Prescriber OR PCP: Allison Ramsey CNP  Last diagnosis associated with med order: 1. Moderate episode of recurrent major depressive disorder (H)  - sertraline (ZOLOFT) 50 MG tablet [Pharmacy Med Name: SERTRALINE HCL 50 MG TABLET]; TAKE 1 TABLET BY MOUTH EVERY DAY  Dispense: 30 tablet; Refill: 1    If protocol passes may refill for 12 months if within 3 months of last provider visit (or a total of 15 months).

## 2021-06-03 NOTE — PATIENT INSTRUCTIONS - HE
Recommendations from today's visit                                                       1. Depression/PMDD:  We will start on sertraline 25mg (1/2 tab) daily for one week and then increase to 50mg (1 tab) daily.  Follow up in 4-6 weeks to assess your response to this.      Next appointment: 4-6 weeks     To reschedule your appointment, please call the clinic directly at [phone].   It was a pleasure seeing you today! I look forward to seeing you again.

## 2021-06-03 NOTE — PROGRESS NOTES
Assessment & Plan   1. Moderate episode of recurrent major depressive disorder (H)  We discussed management of depression and PMDD symptoms.  She is interested in starting on an antidepressant in addition to working closely with her therapist.  Has never previously been treated with medication management.  Symptoms currently moderate, recent history of self injurious behavior.  We reviewed onset to action, side effects monitoring. Start on sertraline 25mg for one week then increase to 50mg. Follow up in 4-6 weeks or sooner if mood worsens.    - sertraline (ZOLOFT) 50 MG tablet; Take 1 tablet (50 mg total) by mouth daily.  Dispense: 30 tablet; Refill: 1    2. PMDD (premenstrual dysphoric disorder)  Discussed treatment options of continuous use of OCP or antidepressant. She would like to start on antidepressant though will also consider continuous OCP in the future.     Allison Ramsey CNP    Subjective   Chief Complaint:  Paperwork (Emotional support animal needed) and Follow-up (pmdd medication)    HPI:   Abeba Toro is a 24 y.o. female who presents for letter and follow up.     History of depression since early adolescence. Has followed with various therapists.  No prior medication treatment.  She states she established with a therapist at [facility] 3.5 years ago when she started school there and this has been a good, supportive relationship for her. This summer she noted depression symptoms worsening and began again using self-injurious behavior of cutting and hitting.  She has been able to work through this with her therapist and has not self harmed in over one month.  She continues to feel depression is worse than baseline.  She is concerned about how she may feeling going into the winter when she tends to isolate and lack in self care.  Notices flare of symptoms prior to period.  Worsening anxiety, panic attack this past month.  Therapist diagnosed with PMDD.     Also requests letter for emotional support  animal.  Dog, breanna for several years.  Helps with self care and motivation.  Gets her up and going daily, home in the evenings and out to walk regularly. Comforts when upset.  Depression worse when she is not around her dog.     Allergies:  has No Known Allergies.    SH/FH:  Social History and Family History reviewed and updated.   Tobacco Status:  She  reports that she has never smoked. She has never used smokeless tobacco.    Review of Systems:  A complete head to toe ROS is negative unless otherwise noted in HPI    Objective     Vitals:    11/11/19 1545   BP: 114/60   Patient Site: Right Arm   Patient Position: Standing   Cuff Size: Adult Regular   Pulse: 84   Weight: (!) 242 lb (109.8 kg)       Physical Exam:  GENERAL: Alert, well-appearing *female  PSYCH: Pleasant mood, affect appropriate.  Good judgment and insight.  Intact recent and remote memory.  Good eye contact.

## 2021-06-04 VITALS
WEIGHT: 242 LBS | HEART RATE: 84 BPM | BODY MASS INDEX: 37.34 KG/M2 | SYSTOLIC BLOOD PRESSURE: 114 MMHG | DIASTOLIC BLOOD PRESSURE: 60 MMHG

## 2021-06-04 VITALS
HEART RATE: 68 BPM | DIASTOLIC BLOOD PRESSURE: 66 MMHG | WEIGHT: 240.5 LBS | SYSTOLIC BLOOD PRESSURE: 112 MMHG | BODY MASS INDEX: 37.11 KG/M2

## 2021-06-04 NOTE — PROGRESS NOTES
Assessment & Plan   1. Moderate episode of recurrent major depressive disorder (H)  Significant improvement in depression and anxiety symptoms with sertraline. Still experiencing some appetite suppression but much better than when she first started. Discussed reminders for healthy meals. Is also struggling with low libido and validated her concerns. Did remind her that this sometimes does improve with time, however if it does not we could consider adding on a small dose of bupropion.  She believes the benefits currently far outweigh the side effects so would like to continue on this.  Plan to recheck in six months or sooner if mood worsens.    - sertraline (ZOLOFT) 50 MG tablet; Take 1 tablet (50 mg total) by mouth daily.  Dispense: 90 tablet; Refill: 1    2. PMDD (premenstrual dysphoric disorder)  Improvement in PMDD symptoms as well with sertraline.  For now will continue with OCP as taken and hold off on continuous use.     Allison Ramsey CNP    Subjective   Chief Complaint:  Depression    HPI:   Abeba Toro is a 24 y.o. female who presents for follow up.     She was seen one month ago for depression and anxiety. Following with a therapist at [facility], however depression symptoms worsening for several months.  Self-injurious behavior of hitting and cutting. PMDD symptoms as well. Started on sertraline 50mg and here today for follow up.  Initially some severe side effects -- felt 'high', poor appetite, nausea. This subsided after two weeks.  Since then has noted she feels much more calm.  Not worrying, able to let things roll off her back.  Less irritable.  She feels lighter, less depressed.  Overall more optimistic. Can still cry and emote. Bothered by decreased libido but otherwise very happy with medication. Feels depression and anxiety significantly improved.      With regards to PMDD, she did have her first period last week since starting the medication and noted she was not nearly as irritable and sad as  previously.  No crying spells. No panic attacks.       Allergies:  has No Known Allergies.    SH/FH:  Social History and Family History reviewed and updated.   Tobacco Status:  She  reports that she has never smoked. She has never used smokeless tobacco.    Review of Systems:  A complete head to toe ROS is negative unless otherwise noted in HPI    Objective   There were no vitals filed for this visit.    Physical Exam:  GENERAL: Alert, well-appearing female .   PSYCH: Pleasant mood, affect appropriate.  Good judgment and insight.  Intact recent and remote memory.  Good eye contact.

## 2021-06-05 VITALS
WEIGHT: 265 LBS | HEIGHT: 68 IN | DIASTOLIC BLOOD PRESSURE: 60 MMHG | BODY MASS INDEX: 40.16 KG/M2 | HEART RATE: 84 BPM | OXYGEN SATURATION: 97 % | SYSTOLIC BLOOD PRESSURE: 110 MMHG

## 2021-06-08 NOTE — TELEPHONE ENCOUNTER
----- Message from Marcy Squires MD sent at 5/20/2020 12:15 PM CDT -----  Regarding: Review  Can you look in to this patient's appointment tomorrow?  If a patient is being evaluated for COVID-19 (she had a negative but worsening symptoms and looks like a second test was recommended), I don't think our clinic is where they are supposed to go to get their breathing assessed when COVID is a possibility.  Shouldn't it be urgent care or someplace where staff have N95 masks?  I don't think this is appropriate.  Thanks.

## 2021-06-08 NOTE — TELEPHONE ENCOUNTER
Received a call regarding this patient from the [facility] scheduling line. The  says the patient is calling to schedule another COVID-19 test. She has a negative COVID-19 test on 5/15/20. Patient completed and [facility] visit today d/t shortness of breath symptoms. The provider recommendations from her [facility] visit included being re-tested for COVID and scheduling an in-person office visit to have breathing assessed. The  calls triage line to ask if it is appropriate to schedule these visits. We are unable to view encounters from [facility] visits in the chart.    I did not talk to the patient herself and did not triage her symptoms. However I told the  that it was my advice that the patient should complete the recommendations given by the [facility] provider. The  should assist her in setting up:     1) another covid19 swab  2) a walk in visit at the clinic today to have her breathing assessed in person. (Patient may need face to face assessment to listen to lungs, check labs, etc.)    The  verbalizes understanding of nurse advice and will help get patient scheduled for today.

## 2021-06-08 NOTE — PROGRESS NOTES
"Assessment:     1. Cough            Plan:     Patient with symptoms concerning for COVID-19.  Patient evaluated with full PPE.  She was just screened by curbside testing for COVID-19 this afternoon.  Advised the patient remain in isolation until she receives the results of her COVID-19 testing back.  She is clinically stable at the present time and O2 saturation is good and does not appear to be dyspneic.  She will continue to wear a mask and stay out of public.  Discussed the importance of good hand hygiene.  She will follow-up if she develops significantly worsening shortness of breath.        Subjective:       24 y.o. female presents for evaluation of a 2-day history of cough, headache, sore throat, anosmia, and mild sensation of shortness of breath.  She does not think she has had a fever.  She has had close contact with her partner who just tested 5 days ago for COVID-19.  She did have a test done herself on 5/15/2020 when she found out her partner was positive but this has come back negative.  She did not on care visit who ordered the curbside testing.  She states that the on care provider felt that she needed to be seen for a visit in the clinic to \"check her lungs\".  I did review the on care visit that does not state this but due to patient's mild shortness of breath she would like to be evaluated.    Patient Active Problem List   Diagnosis     Moderate episode of recurrent major depressive disorder (H)     PMDD (premenstrual dysphoric disorder)       No past medical history on file.    Past Surgical History:   Procedure Laterality Date     NO PAST SURGERIES         Current Outpatient Medications on File Prior to Visit   Medication Sig Dispense Refill     levonorgestrel-ethinyl estradiol (AVIANE,ALESSE,LESSINA) 0.1-20 mg-mcg per tablet Take 1 tablet by mouth daily. 3 Package 2     sertraline (ZOLOFT) 50 MG tablet Take 1 tablet (50 mg total) by mouth daily. 90 tablet 1     valACYclovir (VALTREX) 500 MG tablet " Take 1 tablet (500 mg) by mouth 2 (two) times a day for 3 days as needed for outbreak. Start ASAP after onset of symptoms. 30 tablet 0     No current facility-administered medications on file prior to visit.        No Known Allergies    Family History   Problem Relation Age of Onset     Diabetes Father      Hypertension Father      No Medical Problems Mother        Social History     Socioeconomic History     Marital status: Single     Spouse name: Not on file     Number of children: Not on file     Years of education: Not on file     Highest education level: Not on file   Occupational History     Not on file   Social Needs     Financial resource strain: Not on file     Food insecurity     Worry: Not on file     Inability: Not on file     Transportation needs     Medical: Not on file     Non-medical: Not on file   Tobacco Use     Smoking status: Never Smoker     Smokeless tobacco: Never Used   Substance and Sexual Activity     Alcohol use: Yes     Frequency: 2-4 times a month     Drinks per session: 3 or 4     Drug use: No     Sexual activity: Yes     Partners: Male     Birth control/protection: Pill, Condom   Lifestyle     Physical activity     Days per week: Not on file     Minutes per session: Not on file     Stress: Not on file   Relationships     Social connections     Talks on phone: Not on file     Gets together: Not on file     Attends Zoroastrianism service: Not on file     Active member of club or organization: Not on file     Attends meetings of clubs or organizations: Not on file     Relationship status: Not on file     Intimate partner violence     Fear of current or ex partner: Not on file     Emotionally abused: Not on file     Physically abused: Not on file     Forced sexual activity: Not on file   Other Topics Concern     Not on file   Social History Narrative     Not on file         Review of Systems  A 12 point comprehensive review of systems was negative except as noted.      Objective:     Vitals:     05/20/20 1617   BP: 120/78   Pulse: 68   Resp: 14   Temp: 98.5  F (36.9  C)   SpO2: 98%        General: Alert, pleasant, no distress.  She is not dyspneic.  Speaks in full sentences.   Head:    Normocephalic, without obvious abnormality, atraumatic   Eyes:    Conjunctiva/corneas clear   Ears:    Normal TM's without erythema or bulging. Normal external ear canals, both ears   Nose:   Nares normal, septum midline, mucosa normal, no drainage    or sinus tenderness   Throat:   Lips, mucosa, and tongue normal; teeth and gums normal.  No tonsilar hypertrophy or exudate.   Neck:   Supple, symmetrical, trachea midline, no adenopathy    Lungs:     Clear to auscultation bilaterally without wheezes, rales, or rhonchi, respirations unlabored    Heart:    Regular rate and rhythm, S1 and S2 normal, no murmur, rub or gallop       Extremities:   Extremities normal, atraumatic, no cyanosis or edema   Skin:   Skin color, texture, turgor normal, no rashes or lesions         This note has been dictated using voice recognition software. Any grammatical or context distortions are unintentional and inherent to the software

## 2021-06-08 NOTE — TELEPHONE ENCOUNTER
"  Reason for Disposition    Information only question and nurse able to answer    Answer Assessment - Initial Assessment Questions  1. REASON FOR CALL or QUESTION: \"What is your reason for calling today?\" or \"How can I best help you?\" or \"What question do you have that I can help answer?\"       has questions about scheduling appts for patient that were recommended by [facility] provider.    Protocols used: INFORMATION ONLY CALL - NO TRIAGE-A-OH    "

## 2021-06-11 NOTE — TELEPHONE ENCOUNTER
Refill Approved    Rx renewed per Medication Renewal Policy. Medication was last renewed on 12/18/19.    Lilian Saucedo, [facility] Triage/Med Refill 9/8/2020     Requested Prescriptions   Pending Prescriptions Disp Refills     sertraline (ZOLOFT) 50 MG tablet [Pharmacy Med Name: SERTRALINE HCL 50 MG TABLET] 90 tablet 0     Sig: TAKE 1 TABLET BY MOUTH EVERY DAY       [facility] Refill Protocol  Passed - 9/6/2020  9:25 AM        Passed - PCP or prescribing provider visit in last year     Last office visit with prescriber/PCP: 12/18/2019 Allison Ramsey CNP OR same dept: 12/18/2019 Allison Ramsey CNP OR same specialty: 12/18/2019 Allison Ramsey CNP  Last physical: 5/7/2019 Last MTM visit: Visit date not found   Next visit within 3 mo: Visit date not found  Next physical within 3 mo: Visit date not found  Prescriber OR PCP: Allison Ramsey CNP  Last diagnosis associated with med order: 1. Moderate episode of recurrent major depressive disorder (H)  - sertraline (ZOLOFT) 50 MG tablet [Pharmacy Med Name: SERTRALINE HCL 50 MG TABLET]; TAKE 1 TABLET BY MOUTH EVERY DAY  Dispense: 90 tablet; Refill: 0    If protocol passes may refill for 12 months if within 3 months of last provider visit (or a total of 15 months).

## 2021-06-14 NOTE — TELEPHONE ENCOUNTER
Refill Approved    Rx renewed per Medication Renewal Policy. Medication was last renewed on 4/12//20.    Lilian Saucedo, [facility] Triage/Med Refill 12/24/2020     Requested Prescriptions   Pending Prescriptions Disp Refills     SRONYX 0.1-20 mg-mcg per tablet [Pharmacy Med Name: SRONYX 0.10-0.02 MG TABLET] 84 tablet 1     Sig: TAKE 1 TABLET BY MOUTH EVERY DAY       Oral Contraceptives Protocol Passed - 12/22/2020 12:21 AM        Passed - Visit with PCP or prescribing provider visit in last 12 months      Last office visit with prescriber/PCP: 12/18/2019 Allison Ramsey CNP OR same dept: Visit date not found OR same specialty: 12/18/2019 Allison Ramsey CNP  Last physical: 5/7/2019 Last MTM visit: Visit date not found   Next visit within 3 mo: Visit date not found  Next physical within 3 mo: Visit date not found  Prescriber OR PCP: Allison Ramsey CNP  Last diagnosis associated with med order: 1. Contraception management  - SRONYX 0.1-20 mg-mcg per tablet [Pharmacy Med Name: SRONYX 0.10-0.02 MG TABLET]; TAKE 1 TABLET BY MOUTH EVERY DAY  Dispense: 84 tablet; Refill: 1    If protocol passes may refill for 12 months if within 3 months of last provider visit (or a total of 15 months).

## 2021-06-15 NOTE — PROGRESS NOTES
FEMALE PREVENTIVE EXAM    Assessment & Plan   1. Routine general medical examination at a health care facility  Non-fasting labs. Pap due in one year.  Declines STD screen    2. Moderate episode of recurrent major depressive disorder (H)  Worsening depression symptoms for the past three months. Previously well controlled with sertraline.  Is also quite bothered by decreased libido with sertraline so we will try adding on bupropion.  Advised on side effects, no contraindications. Follow up 4-6 weeks for recheck  - sertraline (ZOLOFT) 50 MG tablet; Take 1 tablet (50 mg total) by mouth daily.  Dispense: 90 tablet; Refill: 3  - buPROPion (WELLBUTRIN XL) 150 MG 24 hr tablet; Take 1 tablet (150 mg total) by mouth daily.  Dispense: 90 tablet; Refill: 0    3. Morbid obesity (H)  Counseled on regular exercise  - Glycosylated Hemoglobin A1c    4. Contraception management  Denies risk factors for use.  Advised 30mcg dose for maximal contraceptive benefit with BMI.  May help control cycles a bit better as well  - levonorgestrel-ethinyl estradiol (NORDETTE) 0.15-0.03 mg per tablet; Take 1 tablet by mouth daily.  Dispense: 3 Package; Refill: 3    Recommend repeat pap smear if normal every three years, Recommended adequate calcium intake/osteoporosis prevention, Discussed breast cancer screening guidelines, Discussed safe sex practices and Discussed diet, including moderation of portions sizes, avoiding eating out and fast food and increase in fruits and vegetables    Allison Ramsey CNP    Subjective:   Chief Complaint:  Annual Exam (no pap needed)    HPI:  Abeba Toro is a 25 y.o. female who presents for routine physical exam.    Depression/anxiety:  Continues on sertraline 50mg.  For the past three months or so has noted worsening depression. Low mood, anhedonia. Low motivation.  Difficulty concentrating. Passive thoughts of being better off not here.  Mild increase in anxiety as well.  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  Stable relationship.  Also having difficulties with low libido since starting sertraline. Vaginal dryness, sex has been painful.     Periods heavier than usual and more cramping.  Still regular with OCP    OB/Gyn History:  Menstrual history: regular periods    Date of previous pap: 2019  History of abnormal pap: none  Current Contraceptive method: OCP    Preventive Health:  Reviewed and recommended screening and treatment recommendations:  Immunizations: up to date    Health Habits:    Exercise: rollerblading regularly.  Calcium intake/Osteoporosis prevention: no    PMH:   Patient Active Problem List   Diagnosis     Moderate episode of recurrent major depressive disorder (H)     PMDD (premenstrual dysphoric disorder)       No past medical history on file.    Current Medications: Reviewed   Current Outpatient Medications on File Prior to Visit   Medication Sig Dispense Refill     sertraline (ZOLOFT) 50 MG tablet TAKE 1 TABLET BY MOUTH EVERY DAY 90 tablet 2     SRONYX 0.1-20 mg-mcg per tablet TAKE 1 TABLET BY MOUTH EVERY DAY 84 tablet 1     [DISCONTINUED] valACYclovir (VALTREX) 500 MG tablet Take 1 tablet (500 mg) by mouth 2 (two) times a day for 3 days as needed for outbreak. Start ASAP after onset of symptoms. 30 tablet 0     No current facility-administered medications on file prior to visit.        Allergies:  Reviewed  has No Known Allergies.    Social History:  Social History     Occupational History     Not on file   Tobacco Use     Smoking status: Never Smoker     Smokeless tobacco: Never Used   Substance and Sexual Activity     Alcohol use: Yes     Frequency: 2-4 times a month     Drinks per session: 3 or 4     Drug use: No     Sexual activity: Yes     Partners: Male     Birth control/protection: Pill, Condom       Family History:   Family History   Problem Relation Age of Onset     Diabetes Father      Hypertension Father      No Medical Problems Mother          Review of Systems:   "Complete head to toe review of systems is otherwise negative except as above.    Objective:    /60   Pulse 84   Ht 5' 8\" (1.727 m)   Wt (!) 265 lb (120.2 kg)   SpO2 97%   BMI 40.29 kg/m      GENERAL: Alert, well-appearing   PSYCH: Pleasant mood, affect appropriate.    SKIN: No atypical lesions  EYES: Conjunctiva pink, sclera white, no exudates. CRISTHIAN.  EOMs intact.   EARS: TMs pearly grey, no bulging, redness, retraction.   MOUTH: Pharynx moist, pink without exudate. No tonsillar enlargement  NECK: No lymphadenopathy. Thyroid borders smooth without enlargement, nodules.   CV: Regular rate and rhythm without murmurs, rubs or gallops.  RESP: Lung sounds clear  ABDOMEN: BS+. Abdomen soft.  No organomegaly  BREASTS: Breasts symmetric, no dimpling, masses or skin discolorations seen. Areolas and nipples symmetric without discharge. On palpation, breast tissue supple and nontender. No masses or nodules. Axillary and epitrochlear lymph nodes nonpalpable.   PV :  No edema        "

## 2021-06-16 PROBLEM — F33.1 MODERATE EPISODE OF RECURRENT MAJOR DEPRESSIVE DISORDER (H): Status: ACTIVE | Noted: 2019-11-11

## 2021-06-16 PROBLEM — F32.81 PMDD (PREMENSTRUAL DYSPHORIC DISORDER): Status: ACTIVE | Noted: 2019-11-11

## 2021-06-16 PROBLEM — E66.01 MORBID OBESITY (H): Status: ACTIVE | Noted: 2021-02-25

## 2021-06-16 NOTE — PROGRESS NOTES
Abeba Toro is a 25 y.o. female who is being evaluated via a billable video visit.      How would you like to obtain your AVS? MyChart.  If dropped from the video visit, the video invitation should be resent by: Text to cell phone: [phone]   Will anyone else be joining your video visit? No      Video Start Time: 8:30 AM    1. Moderate episode of recurrent major depressive disorder (H)  Initially noted improvement in depression symptoms though then worsened again.  Will try increasing dose to 300mg and recheck 4-6 weeks.  If no improvement would adjust sertraline at that time.  She has felt the bupropion is quite helpful with libido so would like to continue it for this indication.    - buPROPion (WELLBUTRIN XL) 300 MG 24 hr tablet; Take 1 tablet (300 mg total) by mouth daily.  Dispense: 90 tablet; Refill: 0    Subjective   Abeba Toro is 25 y.o. and presents today for the following health issues   HPI     She is following up today for depression.  She was seen in February for physical and had noted worsening depression as well as decreased libido on sertraline 50mg.  Added on on bupropion 150mg.  She states she initially noted improvement in mood and energy levels with the medication though she feels this then evened out and now she is unsure if it is helping.  She has noted some sweating, otherwise no side effects.      She does feel like the bupropion has helped quite a bit with libido.  States this is much improved.      Review of Systems  Negative unless otherwise noted in HPI      Objective       Vitals:  No vitals were obtained today due to virtual visit.    Physical Exam  Alert, well appearing.  Pleasant mood, affect appropriate.        Video-Visit Details    Type of service:  Video Visit    Video End Time (time video stopped): 8:39 AM  Originating Location (pt. Location): Home    Distant Location (provider location):  [facility]     Platform used for Video  Visit: Carlos Alberto

## 2021-06-16 NOTE — PROGRESS NOTES
FEMALE PREVENTIVE EXAM    Assessment & Plan   1. Routine general medical examination at a health care facility:      2. Irregular menstrual bleeding:  Normal exam, no risk factors for STDs.  Discussed this is likely related to continuous use of OCPs. Recommended allowing withdrawal bleed next week as planned and then at least 2-3 months of normal cycles before attempting to return to taking continuously.  She may just be someone who does not tolerate taking continuously.  She will let me know if she continues to have difficulty with intermenstrual spotting .    3. Family history of diabetes mellitus in father: Recommended screening every few years.  She will check with insurance, declines today    Recommend pap smear at age 23  Alcohol use, safety and moderation discussed.  Recommended adequate calcium intake/osteoporosis prevention.  Diet discuss, including moderation of portions sizes, avoiding eating out and fast food and increase in fruits and vegetables.  Discussed & recommended seat belt (& motorcycle helmet) use.      Allison Ramsey CNP    Subjective:   Chief Complaint:  Annual Exam (non-fasting) and Menstrual Problem (LMP 1/22, has been bleeding since then, no abnormal plevic pain)    HPI:  Abeba Toro is a 22 y.o. female who presents for routine physical exam.  She is a new patient to the clinic.  Previously seen at [facility].  PMH notable for depression otherwise negative. She is a student at     Her main concern today is her menstrual cycle.  Has been taking Aviane for a little over a year.  She states she previously had difficulty with PMDD and therefore began stacking her pills. Typically gets a period every three months.  She was not due to get her period until the end of February but began bleeding the end of January.  She continued to take her active pills though continued to bleed.  Initially the flow of a period, the last couple of weeks more intermittent spotting. She is monogamous with one  "partner, denies STD risk.  She has not experienced pelvic pain.  Previously no difficulty with intermenstrual spotting.     Mood:  Follows with a therapist, feels depression is well controlled.      OB/Gyn History:  Menstrual history: see HPI  Date of previous pap: none  History of abnormal pap: none  Current Contraceptive method: OCP    Preventive Health:  Reviewed and recommended screening and treatment recommendations:  Immunizations: HPV series completed, up to date on tetanus    Health Habits:    Exercise: no  Calcium intake/Osteoporosis prevention: no  Guns: NO  Sun Screen: YES    PMH:   There is no problem list on file for this patient.      No past medical history on file.    Current Medications: Reviewed   No current outpatient prescriptions on file prior to visit.     No current facility-administered medications on file prior to visit.        Allergies:  Reviewed  has No Known Allergies.    Social History:  Social History     Occupational History     Not on file.     Social History Main Topics     Smoking status: Never Smoker     Smokeless tobacco: Never Used     Alcohol use Not on file     Drug use: Not on file     Sexual activity: Not on file       Family History:   No family history on file.      Review of Systems:  Complete head to toe review of systems is otherwise negative except as above.    Objective:    /64 (Patient Site: Right Arm, Patient Position: Sitting, Cuff Size: Adult Regular)  Pulse 68  Ht 5' 7.5\" (1.715 m)  Wt 217 lb 12 oz (98.8 kg)  LMP 01/22/2018 (Exact Date)  Breastfeeding? No  BMI 33.6 kg/m2    GENERAL: Alert, well-appearing female  PSYCH: Pleasant mood, affect appropriate.    SKIN: No atypical lesions  EYES: Conjunctiva pink, sclera white, no exudates. CRISTHIAN.  EOMs intact. Corneal light reflex bilaterally, red reflex present. Undilated fundoscopic exam normal  EARS: TMs pearly grey, no bulging, redness, retraction.   MOUTH: Pharynx moist, pink without exudate. No " tonsillar enlargement  NECK: No lymphadenopathy. Thyroid borders smooth without enlargement, nodules.   CV: Regular rate and rhythm without murmurs, rubs or gallops.  RESP: Lung sounds clear  ABDOMEN: BS+. Abdomen soft, nontender to palpation without guarding. No organomegaly  BREASTS: Breasts symmetric, no dimpling, masses or skin discolorations seen. Areolas and nipples symmetric without discharge. On palpation, breast tissue supple and nontender. No masses or nodules. Axillary and epitrochlear lymph nodes nonpalpable.    FEMALE: External genitalia without lesions. Vaginal walls and cervix without lesions or masses. No abnormal discharge. On bimanual palpation, uterus mobile, normal shape and contour. No adnexal masses or tenderness.   MSK: Spine and extremities in alignment.   PV : No edema

## 2021-06-17 NOTE — TELEPHONE ENCOUNTER
buPROPion (WELLBUTRIN XL) 150 MG 24 hr tablet [[id number]]    Electronically signed by: Allison Ramsey CNP on 02/25/21 1330 Status: Discontinued   Ordering user: Allison Ramsey CNP 02/25/21 1330 Authorized by: Allison Ramsey CNP   Frequency: DAILY 02/25/21 - 04/05/21  Discontinued by: Allison Ramsey CNP 04/05/21 0839 [Reorder]   Diagnoses   Moderate episode of recurrent major depressive disorder (H) [F33.1]

## 2021-06-19 NOTE — LETTER
Letter by Allison Ramsey CNP at      Author: Allison Ramsey CNP Service: -- Author Type: --    Filed:  Encounter Date: 11/11/2019 Status: Signed         November 11, 2019     Patient: Abeba Toro   YOB: 1995   Date of Visit: 11/11/2019       To Whom It May Concern:    It is my medical opinion that Abeba Toro benefits greatly from the presence of her emotional support animal, a [place] dog. She is followed clinically for management of recurrent major depression and her emotional support animal has been a major part of her treatment plan.  Removing her ability to own and care for this dog could result in worsening control of her medical condition.     If you have any questions or concerns, please don't hesitate to call.    Sincerely,        Electronically signed by Allison Ramsey CNP

## 2021-06-20 ENCOUNTER — HEALTH MAINTENANCE LETTER (OUTPATIENT)
Age: 26
End: 2021-06-20

## 2021-06-20 NOTE — LETTER
Letter by Allison Ramsey CNP at      Author: Allison Ramsey CNP Service: -- Author Type: --    Filed:  Encounter Date: 12/18/2019 Status: Signed                    My Depression Action Plan  Name: Abeba Toro   Date of Birth 1995  Date: 12/18/2019    My Doctor: Allison Ramsey CNP   My Clinic: [facility]  [address] [facility] [address]  [phone]          GREEN    ZONE   Good Control    What it looks like:     Things are going generally well. You have normal ups and downs. You may even feel depressed from time to time, but bad moods usually last less than a day.   What you need to do:  1. Continue to care for yourself (see self care plan)  2. Check your depression survival kit and update it as needed  3. Follow your physicians recommendations including any medication.  4. Do not stop taking medication unless you consult with your physician first.           YELLOW         ZONE Getting Worse    What it looks like:     Depression is starting to interfere with your life.     It may be hard to get out of bed; you may be starting to isolate yourself from others.    Symptoms of depression are starting to last most all day and this has happened for several days.     You may have suicidal thoughts but they are not constant.   What you need to do:     1. Call your care team, your response to treatment will improve if you keep your care team informed of your progress. Yellow periods are signs an adjustment may need to be made.     2. Continue your self-care, even if you have to fake it!    3. Talk to someone in your support network    4. Open up your depression survival kit           RED    ZONE Medical Alert - Get Help    What it looks like:     Depression is seriously interfering with your life.     You may experience these or other symptoms: You cant get out of bed most days, cant work or engage in other necessary activities, you have trouble taking care of basic hygiene, or  basic responsibilities, thoughts of suicide or death that will not go away, self-injurious behavior.     What you need to do:  1. Call your care team and request a same-day appointment. If they are not available (weekends or after hours) call your local crisis line, emergency room or 911.            Depression Self Care Plan / Survival Kit    Self-Care for Depression  Heres the deal. Your body and mind are really not as separate as most people think.  What you do and think affects how you feel and how you feel influences what you do and think. This means if you do things that people who feel good do, it will help you feel better.  Sometimes this is all it takes.  There is also a place for medication and therapy depending on how severe your depression is, so be sure to consult with your medical provider and/ or Behavioral Health Consultant if your symptoms are worsening or not improving.     In order to better manage my stress, I will:    Exercise  Get some form of exercise, every day. This will help reduce pain and release endorphins, the feel good chemicals in your brain. This is almost as good as taking antidepressants!  This is not the same as joining a gym and then never going! (they count on that by the way?) It can be as simple as just going for a walk or doing some gardening, anything that will get you moving.      Hygiene   Maintain good hygiene (Get out of bed in the morning, Make your bed, Brush your teeth, Take a shower, and Get dressed like you were going to work, even if you are unemployed).  If your clothes don't fit try to get ones that do.    Diet  I will strive to eat foods that are good for me, drink plenty of water, and avoid excessive sugar, caffeine, alcohol, and other mood-altering substances.  Some foods that are helpful in depression are: complex carbohydrates, B vitamins, flaxseed, fish or fish oil, fresh fruits and vegetables.    Psychotherapy  I agree to participate in Individual Therapy  (if recommended).    Medication  If prescribed medications, I agree to take them.  Missing doses can result in serious side effects.  I understand that drinking alcohol, or other illicit drug use, may cause potential side effects.  I will not stop my medication abruptly without first discussing it with my provider.    Staying Connected With Others  I will stay in touch with my friends, family members, and my primary care provider/team.    Use your imagination  Be creative.  We all have a creative side; it doesnt matter if its oil painting, sand castles, or mud pies! This will also kick up the endorphins.    Witness Beauty  (AKA stop and smell the roses) Take a look outside, even in mid-winter. Notice colors, textures. Watch the squirrels and birds.     Service to others  Be of service to others.  There is always someone else in need.  By helping others we can get out of ourselves and remember the really important things.  This also provides opportunities for practicing all the other parts of the program.    Humor  Laugh and be silly!  Adjust your TV habits for less news and crime-drama and more comedy.    Control your stress  Try breathing deep, massage therapy, biofeedback, and meditation. Find time to relax each day.     My support system    Clinic Contact:  Phone number:    Contact 1:  Phone number:    Contact 2:  Phone number:    Spiritism/:  Phone number:    Therapist:  Phone number:    [facility] crisis center:    Phone number:    Other community support:  Phone number:

## 2021-06-20 NOTE — LETTER
Letter by Nissen, Lynette, RN at      Author: Nissen, Lynette, RN Service: -- Author Type: --    Filed:  Encounter Date: 5/17/2020 Status: (Other)       5/17/2020        Abeba Toro  [address] [place] [id number]    This letter provides a written record that you were tested for COVID-19 on 5/15/20.     Your result was negative.    This means that we didnt find the virus that causes COVID-19 in your sample. A test may show negative when you do actually have the virus. This can happen when the virus is in the early stages of infection, before you feel illness symptoms.    Even if you dont have symptoms, they may still appear. For safety, its very important to follow these rules.    Keep yourself away from others (self-isolation):      Stay home. Dont go to work, school or anywhere else.     Stay in your own room (and use your own bathroom), if you can.    Stay away from others in your home. No hugging, kissing or shaking hands. No visitors.    Clean high touch surfaces often (doorknobs, counters, handles, etc.). Use a household cleaning spray or wipes.    Cover your mouth and nose with a mask, tissue or washcloth to avoid spreading germs.    Wash your hands and face often with soap and water.    Stay in self-isolation until you meet ALL of the guidelines below:    1. You have had no fever for at least 72 hours (that is 3 full days of no fever without the use of medicine that reduces fevers), AND  2. other symptoms (such as cough, shortness of breath) have gotten better, AND  3. at least 10 days have passed since your symptoms first appeared.    Going back to work  Check with your employer for any guidelines to follow for going back to work.    Employers: This document serves as formal notice that your employee tested negative for COVID-19, as of the testing date shown above.    For questions regarding this letter or your Negative COVID-19 result, call [phone] between 8A to 6:30P (M-F) and 10A to  6:30P (weekends).

## 2021-06-20 NOTE — LETTER
Letter by Lazara Talbert RN at      Author: Lazara Talbert RN Service: -- Author Type: --    Filed:  Encounter Date: 5/22/2020 Status: (Other)       5/22/2020        Abeba Toro  [address] [place] [id number]    This letter provides a written record that you were tested for COVID-19 on 5/20/2020.     Your result was negative.    This means that we didnt find the virus that causes COVID-19 in your sample. A test may show negative when you do actually have the virus. This can happen when the virus is in the early stages of infection, before you feel illness symptoms.    Even if you dont have symptoms, they may still appear. For safety, its very important to follow these rules.    Keep yourself away from others (self-isolation):      Stay home. Dont go to work, school or anywhere else.     Stay in your own room (and use your own bathroom), if you can.    Stay away from others in your home. No hugging, kissing or shaking hands. No visitors.    Clean high touch surfaces often (doorknobs, counters, handles, etc.). Use a household cleaning spray or wipes.    Cover your mouth and nose with a mask, tissue or washcloth to avoid spreading germs.    Wash your hands and face often with soap and water.    Stay in self-isolation until you meet ALL of the guidelines below:    1. You have had no fever for at least 72 hours (that is 3 full days of no fever without the use of medicine that reduces fevers), AND  2. other symptoms (such as cough, shortness of breath) have gotten better, AND  3. at least 10 days have passed since your symptoms first appeared.    Going back to work  Check with your employer for any guidelines to follow for going back to work.    Employers: This document serves as formal notice that your employee tested negative for COVID-19, as of the testing date shown above.    For questions regarding this letter or your Negative COVID-19 result, call [phone] between 8A to 6:30P (M-F) and 10A to  6:30P (weekends).

## 2021-06-21 NOTE — LETTER
Letter by Allison Ramsey CNP at      Author: Allison Ramsey CNP Service: -- Author Type: --    Filed:  Encounter Date: 2/25/2021 Status: (Other)         February 25, 2021     Patient: Abeba Toro   YOB: 1995   Date of Visit: 2/25/2021       To Whom It May Concern:       It is my medical opinion that Abeba Toro benefits greatly from the presence of her emotional support animal, a chitoy[place]apOneua dog. She is followed clinically for management of recurrent major depression and her emotional support animal has been a major part of her treatment plan.  Removing her ability to own and care for this dog could result in worsening control of her medical condition.      If you have any questions or concerns, please don't hesitate to call.     Sincerely,             Electronically signed by Allison Ramsey CNP

## 2021-06-22 NOTE — PROGRESS NOTES
Subjective:   Abeba Toro is a(n) 23 y.o. White or  female who presents to [facility] with the following complaint(s):  poss STI (x4days Vainal pain, )    History of Present Illness:  Primary symptom: Painful vulvar and perineal skin lesions  Onset: 4 day(s) ago  Progression: Worsening, continues to develop new lesions  Vaginal itching: Yes  Vaginal discharge: No  Vaginal odor: No  Vaginal bleeding: Having menstrual flow currently; started 4 days ago, normal in flow and duration  Dyspareunia: No  Currently pregnant: No  Menstrual status: Every 28 days (takes an OCP)  Urinary symptoms: Has dysuria; no polyuria or nocturia  Suprapubic pain: No  Fevers: No  Chills: No  Home treatments utilized: None  History of STIs: Chlamydia  History of yeast infection: Yes  History of bacterial vaginosis: No  History of UTIs: No  Sexual partner symptomatic: No  Tobacco user: No  Additional comments: Has some tenderness in the groin bilaterally. Has had multiple sexual partners in recent months.     The following portions of the patient's history were reviewed and updated as appropriate: allergies, current medications, past family history, past medical history, past social history, past surgical history and problem list.    Review of Systems:   Review of Systems   All other systems reviewed and are negative.    Objective:     Vitals:    12/10/18 1509   BP: 110/61   Pulse: 95   Resp: 18   Temp: 97  F (36.1  C)   TempSrc: Other   SpO2: 98%   Weight: (!) 249 lb (112.9 kg)     Physical Exam   Constitutional: She is oriented to person, place, and time. She appears well-developed.  Non-toxic appearance. She does not appear ill. No distress.   Obese.    Cardiovascular: Normal rate, regular rhythm, S1 normal and S2 normal. Exam reveals no gallop and no friction rub.   No murmur heard.  Pulmonary/Chest: Effort normal and breath sounds normal. No stridor. She has no wheezes. She has no rhonchi. She has no rales.   Abdominal:  Soft. Bowel sounds are normal. She exhibits no distension. There is no tenderness. There is no CVA tenderness.   Genitourinary: Pelvic exam was performed with patient prone. There is lesion on the right labia. There is lesion on the left labia.   Genitourinary Comments: MA present for this portion of the examination. Numerous gray ulcerations on the labia majora extending posteriorly onto the perineal area but not involving the perianal area. Several of the more posterior lesions remain vesicular. Several of these lesions were lanced and swabbed for HSV testing. Vagina was swabbed for wet prep. Bloody vaginal discharge present. Speculum and bimanual examination were not completed due to menstruation.    Neurological: She is alert and oriented to person, place, and time.   Skin: Skin is warm and dry. No pallor.   Nursing note and vitals reviewed.    Laboratory:  Results for orders placed or performed in visit on 12/10/18   Wet Prep, Vaginal   Result Value Ref Range    Yeast Result No yeast seen No yeast seen    Trichomonas No Trichomonas seen No Trichomonas seen    Clue Cells, Wet Prep Clue cells seen (!) No Clue cells seen   Urinalysis-UC if Indicated   Result Value Ref Range    Color, UA Yellow Colorless, Yellow, Straw, Light Yellow    Clarity, UA Clear Clear    Glucose, UA Negative Negative    Bilirubin, UA Negative Negative    Ketones, UA Negative Negative    Specific Gravity, UA >=1.030 1.005 - 1.030    Blood, UA Large (!) Negative    pH, UA 6.5 5.0 - 8.0    Protein, UA Trace (!) Negative mg/dL    Urobilinogen, UA 0.2 E.U./dL 0.2 E.U./dL, 1.0 E.U./dL    Nitrite, UA Negative Negative    Leukocytes, UA Small (!) Negative    Bacteria, UA Few (!) None Seen hpf    RBC, UA  (!) None Seen, 0-2 hpf    WBC, UA 5-10 (!) None Seen, 0-5 hpf    Squam Epithel, UA 5-10 (!) None Seen, 0-5 lpf       Radiology:  N/A    Electrocardiogram:  N/A    Assessment/Plan   1. Herpes simplex vulvovaginitis  - Herpes Simplex PCR (not  CSF)  - valACYclovir (VALTREX) 1000 MG tablet; Take 1 tablet (1,000 mg total) by mouth 2 (two) times a day for 10 days.  Dispense: 20 tablet; Refill: 0  - valACYclovir (VALTREX) 500 MG tablet; Take 1 tablet (500 mg) by mouth 2 (two) times a day for 3 days as needed for outbreak. Start ASAP after onset of symptoms.  Dispense: 30 tablet; Refill: 0    2. Bacterial vaginosis  - Wet Prep, Vaginal  - metroNIDAZOLE (FLAGYL) 500 MG tablet; Take 1 tablet (500 mg total) by mouth 2 (two) times a day for 7 days No alcohol while on this medication..  Dispense: 14 tablet; Refill: 0    3. Dysuria  - Chlamydia trachomatis & Neisseria gonorrhoeae, Amplified Detection  - Urinalysis-UC if Indicated  - Wet Prep, Vaginal  - Culture, Urine    - Advised patient that her vulvar / perineal lesions are consistent with genital herpes. HSV testing in process. Treating with valacyclovir as listed above. Discussed the chronic / recurrent nature of this infection. Prescribed lower dose valacyclovir to be used as needed for recurrences. Instructed patient to advise her recent sexual partners that she has been diagnosed with genital herpes.   - Treating bacterial vaginosis with metronidazole as listed above.   - Testing for Gonorrhea and Chlamydia in process. Will treat appropriately if tests return positive.   - Urinalysis not consistent with UTI. Urine culture in process. Will treat appropriately if culture returns positive.   - Recommended screening for additional STIs, including Syphilis, HIV, Hepatitis B, and Hepatitis C. Patient declined screening at this time, stating that she will pursue it with her PCP or [facility].   - Discussed use of barrier protection for all sexual encounters.   - Counseled patient regarding assessment and plan for evaluation and treatment. Questions were answered. See AVS for the specific written instructions and educational handout(s) regarding Genital Herpes and Bacterial Vaginosis that were provided at  the conclusion of the visit.   - Discussed signs / symptoms that warrant urgent / emergent medical attention.   - Follow up as needed.     Herber Mena MD

## 2021-07-03 NOTE — ADDENDUM NOTE
Addendum Note by Marcia Kapadia CNP at 1/18/2020  4:52 PM     Author: Marcia Kapadia CNP Service: -- Author Type: Nurse Practitioner    Filed: 1/18/2020  4:52 PM Encounter Date: 1/14/2020 Status: Signed    : Marcia Kapadia CNP (Nurse Practitioner)    Addended by: MARCIA KAPADIA on: 1/18/2020 04:52 PM        Modules accepted: Orders

## 2021-10-10 ENCOUNTER — HEALTH MAINTENANCE LETTER (OUTPATIENT)
Age: 26
End: 2021-10-10

## 2022-01-09 ENCOUNTER — MYC REFILL (OUTPATIENT)
Dept: FAMILY MEDICINE | Facility: CLINIC | Age: 27
End: 2022-01-09
Payer: COMMERCIAL

## 2022-01-09 DIAGNOSIS — F33.1 MODERATE EPISODE OF RECURRENT MAJOR DEPRESSIVE DISORDER (H): ICD-10-CM

## 2022-01-09 DIAGNOSIS — Z30.9 CONTRACEPTION MANAGEMENT: ICD-10-CM

## 2022-01-11 RX ORDER — LEVONORGESTREL AND ETHINYL ESTRADIOL 0.15-0.03
1 KIT ORAL DAILY
Qty: 90 TABLET | Refills: 0 | Status: SHIPPED | OUTPATIENT
Start: 2022-01-11 | End: 2022-02-24

## 2022-01-11 RX ORDER — BUPROPION HYDROCHLORIDE 300 MG/1
300 TABLET ORAL DAILY
Qty: 90 TABLET | Refills: 0 | Status: SHIPPED | OUTPATIENT
Start: 2022-01-11 | End: 2022-05-26

## 2022-01-11 NOTE — TELEPHONE ENCOUNTER
Pt is calling as she is stuck in [place] and hoping to get her meds.    She would like a 7 days of scripts to be sent in please    Pharm is updated to [facility] in [place].

## 2022-01-12 NOTE — TELEPHONE ENCOUNTER
"Routing refill request to provider for review/approval because:  Labs not current:  PHQ-9  Patient needs to be seen because it has been more than 6 months since last office visit.    Last Written Prescription Date:  2/25/21  Last Fill Quantity: 3,  # refills: 3   Last office visit provider:  5/3/21     Requested Prescriptions   Pending Prescriptions Disp Refills     levonorgestrel-ethinyl estradiol (NORDETTE) 0.15-30 MG-MCG tablet       Sig: Take 1 tablet by mouth daily       Contraceptives Protocol Passed - 1/11/2022  6:25 PM        Passed - Patient is not a current smoker if age is 35 or older        Passed - Recent (12 mo) or future (30 days) visit within the authorizing provider's specialty     Patient has had an office visit with the authorizing provider or a provider within the authorizing providers department within the previous 12 mos or has a future within next 30 days. See \"Patient Info\" tab in inbasket, or \"Choose Columns\" in Meds & Orders section of the refill encounter.              Passed - Medication is active on med list        Passed - No active pregnancy on record        Passed - No positive pregnancy test in past 12 months           buPROPion (WELLBUTRIN XL) 300 MG 24 hr tablet 90 tablet 3     Sig: Take 1 tablet (300 mg) by mouth daily       SSRIs Protocol Failed - 1/11/2022  6:25 PM        Failed - PHQ-9 score less than 5 in past 6 months     Please review last PHQ-9 score.           Failed - Recent (6 mo) or future (30 days) visit within the authorizing provider's specialty     Patient had office visit in the last 6 months or has a visit in the next 30 days with authorizing provider or within the authorizing provider's specialty.  See \"Patient Info\" tab in inbasket, or \"Choose Columns\" in Meds & Orders section of the refill encounter.            Passed - Medication is Bupropion     If the medication is Bupropion (Wellbutrin), and the patient is taking for smoking cessation; OK to refill.         " " Passed - Medication is active on med list        Passed - Patient is age 18 or older        Passed - No active pregnancy on record        Passed - No positive pregnancy test in last 12 months           sertraline (ZOLOFT) 50 MG tablet 90 tablet 3     Sig: Take 1 tablet (50 mg) by mouth daily       SSRIs Protocol Failed - 1/11/2022  6:25 PM        Failed - PHQ-9 score less than 5 in past 6 months     Please review last PHQ-9 score.           Failed - Recent (6 mo) or future (30 days) visit within the authorizing provider's specialty     Patient had office visit in the last 6 months or has a visit in the next 30 days with authorizing provider or within the authorizing provider's specialty.  See \"Patient Info\" tab in inbasket, or \"Choose Columns\" in Meds & Orders section of the refill encounter.            Passed - Medication is Bupropion     If the medication is Bupropion (Wellbutrin), and the patient is taking for smoking cessation; OK to refill.          Passed - Medication is active on med list        Passed - Patient is age 18 or older        Passed - No active pregnancy on record        Passed - No positive pregnancy test in last 12 months             darius arambula RN 01/11/22 8:09 PM  "

## 2022-02-23 DIAGNOSIS — Z30.9 CONTRACEPTION MANAGEMENT: ICD-10-CM

## 2022-02-23 NOTE — TELEPHONE ENCOUNTER
"Routing refill request to provider for review/approval because:  Drug not active on patient's medication list      Last Written Prescription Date:  n/a  Last Fill Quantity: n/a,  # refills: n/a   Last office visit provider:  5/3/21     Requested Prescriptions   Pending Prescriptions Disp Refills     ALTAVERA 0.15-30 MG-MCG tablet [Pharmacy Med Name: ALTAVERA-28 TABLET] 84 tablet 3     Sig: TAKE 1 TABLET BY MOUTH EVERY DAY       Contraceptives Protocol Passed - 2/23/2022  3:22 AM        Passed - Patient is not a current smoker if age is 35 or older        Passed - Recent (12 mo) or future (30 days) visit within the authorizing provider's specialty     Patient has had an office visit with the authorizing provider or a provider within the authorizing providers department within the previous 12 mos or has a future within next 30 days. See \"Patient Info\" tab in inbasket, or \"Choose Columns\" in Meds & Orders section of the refill encounter.              Passed - Medication is active on med list        Passed - No active pregnancy on record        Passed - No positive pregnancy test in past 12 months             Lexii Mckeon RN 02/23/22 12:54 PM  "

## 2022-02-24 RX ORDER — LEVONORGESTREL AND ETHINYL ESTRADIOL 0.15-0.03
KIT ORAL
Qty: 84 TABLET | Refills: 1 | Status: SHIPPED | OUTPATIENT
Start: 2022-02-24 | End: 2022-07-18

## 2022-03-27 ENCOUNTER — HEALTH MAINTENANCE LETTER (OUTPATIENT)
Age: 27
End: 2022-03-27

## 2022-04-11 ENCOUNTER — OFFICE VISIT (OUTPATIENT)
Dept: FAMILY MEDICINE | Facility: CLINIC | Age: 27
End: 2022-04-11
Payer: COMMERCIAL

## 2022-04-11 VITALS
TEMPERATURE: 97.9 F | RESPIRATION RATE: 15 BRPM | HEIGHT: 68 IN | OXYGEN SATURATION: 97 % | BODY MASS INDEX: 39.4 KG/M2 | DIASTOLIC BLOOD PRESSURE: 79 MMHG | WEIGHT: 260 LBS | SYSTOLIC BLOOD PRESSURE: 114 MMHG | HEART RATE: 79 BPM

## 2022-04-11 DIAGNOSIS — R07.89 ATYPICAL CHEST PAIN: Primary | ICD-10-CM

## 2022-04-11 DIAGNOSIS — R51.9 GENERALIZED HEADACHE: ICD-10-CM

## 2022-04-11 PROCEDURE — 99214 OFFICE O/P EST MOD 30 MIN: CPT | Performed by: FAMILY MEDICINE

## 2022-04-11 PROCEDURE — 93000 ELECTROCARDIOGRAM COMPLETE: CPT | Performed by: FAMILY MEDICINE

## 2022-04-11 ASSESSMENT — PATIENT HEALTH QUESTIONNAIRE - PHQ9: SUM OF ALL RESPONSES TO PHQ QUESTIONS 1-9: 5

## 2022-04-11 NOTE — PATIENT INSTRUCTIONS
I think your chest pain is most likely related to your GI tract.  Let's have you take Prilosec once daily for 2 weeks.  I also recommend you keep some TUMS or Rolaids on hand and take that if you get a recurrence of your pain.  If that does not resolve your symptoms I recommend that you go to an ER while you are still having the pain.

## 2022-04-11 NOTE — PROGRESS NOTES
Assessment & Plan     Atypical chest pain  Ddx considered includes: coronary disease, valve disease (aortic stenosis or insufficiency), cardiomyopathy, pericarditis, aortic dissection, PE, pneumonia, pleuritis, ptx, lung tumor, GERD, esophageal spasm or rupture, PUD, Niyah-Coulter tear, pancreatitis, biliary disease, costochondritis, thoracic radiculopathy, zoster, and anxiety.  The pain was associated with shortness of breath, sweats and nausea so we did an EKG as she is still having some lingering pain now - that was normal and reassuring.  Given the timing (following a fatty meal) and belching I suspect this is GI in etiology and I recommended a short course of PPI (14-day course of daily omeprazole).  I also recommended she try OTC antacid for any future episodes and go to ER if symptoms do not respond to that.    - EKG 12-lead complete w/read - Clinics  - omeprazole (PRILOSEC) 20 MG DR capsule  Dispense: 14 capsule; Refill: 0    Generalized headache  Unclear how this relates to her chest pain.  No red flag signs/symptoms.       She has routine preventive follow-up with her PCP at the end of the month.    Jes Allan MD  [facility]        Ney Us is a 26 year old who presents for the following health issues     Chest Pain     History of Present Illness       Reason for visit:  Chest pain on left side, going to shoulder  Symptom onset:  Today  Symptoms include:  Chest tightness, headache, restlessness  Symptom intensity:  Severe  Symptom progression:  Improving  Had these symptoms before:  No  What makes it worse:  Laying down and sitting down  What makes it better:  Walking and standing    She eats 0-1 servings of fruits and vegetables daily.She consumes 1 sweetened beverage(s) daily.She exercises with enough effort to increase her heart rate 10 to 19 minutes per day.  She exercises with enough effort to increase her heart rate 4 days per week.   She is taking  "medications regularly.     Started last night around 9:30 after she got back from [employer].  Left-sided upper chest pain and headache.  She had eaten about an hour prior.  Had crispy chicken from [employer].  Chest pain radiated into left jaw but didn't radiate into back or abdomen.  Associated with nausea, sweats, and some shortness of breath last night.  No vomiting.  No palpitations  Some belching.  No acid brash.  No abdominal pain or diarrhea.  No cough, runny nose, sore throat.  No pain or swelling in the legs.  No recent travel or prolonged immobility.  Headache was generalized.  No dizziness, vision change, photo- or phono-sensitivity.  She took acetaminophen and it didn't seem to help.  Felt better if she was up and walking around - worse if she was sitting or lying still.  The pain kept her up last night.  Felt a bit better if she pressed on her chest.  Symptoms have subsided - still present today but improved.  Was a 8/10 and now a 2/10.    FHX: mother with history of gallstones, maternal grandfather with heart disease    Review of Systems   Cardiovascular: Positive for chest pain.   additional as above       Objective    /79 (BP Location: Right arm, Patient Position: Chair, Cuff Size: Adult Large)   Pulse 79   Temp 97.9  F (36.6  C) (Temporal)   Resp 15   Ht 1.715 m (5' 7.5\")   Wt 117.9 kg (260 lb)   LMP 03/14/2022 (Exact Date)   SpO2 97%   BMI 40.12 kg/m    Body mass index is 40.12 kg/m .  Physical Exam   GENERAL APPEARANCE: healthy, alert and no distress  EYES: Eyes grossly normal to inspection, conjunctivae and sclerae normal  HENT: ear canals and TM's normal  NECK: no adenopathy, no asymmetry, masses, or scars and thyroid normal to palpation  RESP: lungs clear to auscultation - no rales, rhonchi or wheezes  CV: regular rate and rhythm, normal S1 S2, no S3 or S4, no murmur, click or rub, no peripheral edema   CHEST:  No tenderness to palpation over costo-chondral joints   ABDOMEN: soft, " nontender, no hepatosplenomegaly, no masses   SKIN: no suspicious lesions or rashes  EXTREM: no calf tenderness to palpation   NEURO: Normal strength and tone, sensory exam grossly normal, mentation intact and speech normal  PSYCH: mentation appears normal and affect normal/bright     EKG - Reviewed and interpreted by me appears normal, NSR, normal axis, normal intervals, no acute ST/T changes c/w ischemia, no LVH by voltage criteria

## 2022-04-12 ENCOUNTER — OFFICE VISIT (OUTPATIENT)
Dept: URGENT CARE | Facility: URGENT CARE | Age: 27
End: 2022-04-12
Payer: COMMERCIAL

## 2022-04-12 VITALS
DIASTOLIC BLOOD PRESSURE: 78 MMHG | OXYGEN SATURATION: 99 % | BODY MASS INDEX: 40.12 KG/M2 | WEIGHT: 260 LBS | SYSTOLIC BLOOD PRESSURE: 132 MMHG | HEART RATE: 100 BPM | TEMPERATURE: 98.5 F

## 2022-04-12 DIAGNOSIS — R22.0 SWELLING OF FACE: Primary | ICD-10-CM

## 2022-04-12 DIAGNOSIS — T78.40XA ALLERGIC REACTION, INITIAL ENCOUNTER: ICD-10-CM

## 2022-04-12 PROCEDURE — 99213 OFFICE O/P EST LOW 20 MIN: CPT | Performed by: FAMILY MEDICINE

## 2022-04-12 NOTE — PROGRESS NOTES
Chief Complaint   Patient presents with     Urgent Care     Facial Swelling     C/O facial swollen for 1 day     Initial differential diagnosis included but was not restricted to   Differential Diagnosis:  Allergic reaction, cellulitis , bug bite .  Medical Decision Making:    ASSESMENT AND PLAN   Abeba was seen today for urgent care and facial swelling.    Diagnoses and all orders for this visit:    Swelling of face    Allergic reaction, initial encounter      Continue doing benadryl   Also suggested to do cetirizine once daily   As no other systemic symptoms no further testing needed   Discussed if noticing worsening symptoms should follow up     Routine discharge counseling was given to the patient and the patient understands that worsening, changing or persistent symptoms should prompt an immediate call or follow up with their primary physician or the emergency department. The importance of appropriate follow up was also discussed with the patient.     I have reviewed the nursing notes.  Review of the result(s) of each unique test   X-Ray was not done.    Time  spent on the date of the encounter doing chart review, patient visit and documentation   see orders in Epic  Pt verbalized and agreed with the plan and is aware of the worsening symptoms for which would need to follow up .  Pt was stable during time of discharge from the clinic     SUBJECTIVE     Abeba Toro is a 26 year old female presenting with a chief complaint of    Chief Complaint   Patient presents with     Urgent Care     Facial Swelling     C/O facial swollen for 1 day           Abeba Toro is a 26 year old female presenting with a chief complaint of face swelling bilateral cheek area . She is an established patient of [facility].  Onset of symptoms was 1 day(s) ago.  She cannot recall any allergies but had used hair dye 3 days back   Course of illness is same.    Severity moderate  Current and Associated symptoms: face swelling    Treatment measures tried include None tried.  Predisposing factors include None.    History reviewed. No pertinent past medical history.  Current Outpatient Medications   Medication Sig Dispense Refill     ALTAVERA 0.15-30 MG-MCG tablet TAKE 1 TABLET BY MOUTH EVERY DAY 84 tablet 1     buPROPion (WELLBUTRIN XL) 300 MG 24 hr tablet Take 1 tablet (300 mg) by mouth daily 90 tablet 0     omeprazole (PRILOSEC) 20 MG DR capsule Take 1 capsule (20 mg) by mouth in the morning for 14 days. 14 capsule 0     sertraline (ZOLOFT) 50 MG tablet Take 1 tablet (50 mg) by mouth daily 90 tablet 0     Social History     Tobacco Use     Smoking status: Never Smoker     Smokeless tobacco: Never Used   Substance Use Topics     Alcohol use: Yes     Family History   Problem Relation Age of Onset     Cholelithiasis Mother      Diabetes Father      Hypertension Father      Heart Disease Maternal Grandfather          ROS:    10 point ROS of systems including Constitutional, Eyes, Respiratory, Cardiovascular, Gastroenterology, Genitourinary, Integumentary, Muscularskeletal, Psychiatric ,neurological were all negative except for pertinent positives noted in my HPI         OBJECTIVE:    /78   Pulse 100   Temp 98.5  F (36.9  C) (Tympanic)   Wt 117.9 kg (260 lb)   LMP 03/14/2022 (Exact Date)   SpO2 99%   BMI 40.12 kg/m    GENERAL APPEARANCE: healthy, alert and no distress  EYES: EOMI,  PERRL, conjunctiva clear  HENT: ear canals and TM's normal.  Nose and mouth without ulcers, erythema or lesions  NECK: supple, nontender, no lymphadenopathy  RESP: lungs clear to auscultation - no rales, rhonchi or wheezes  CV: regular rates and rhythm, normal S1 S2, no murmur noted  SKIN: diffuse swelling of the face around the cheek area with some eyelid puffiness, no tenderness noted   PSYCH: mentation appears normal  Physical Exam      (Note was completed, in part, with Turn voice-recognition software. Documentation reviewed, but some grammatical,  spelling, and word errors may remain.)  Irlanda Torres MD

## 2022-05-26 ENCOUNTER — OFFICE VISIT (OUTPATIENT)
Dept: FAMILY MEDICINE | Facility: CLINIC | Age: 27
End: 2022-05-26
Payer: COMMERCIAL

## 2022-05-26 VITALS
RESPIRATION RATE: 20 BRPM | OXYGEN SATURATION: 98 % | HEART RATE: 84 BPM | BODY MASS INDEX: 39.21 KG/M2 | SYSTOLIC BLOOD PRESSURE: 108 MMHG | HEIGHT: 68 IN | DIASTOLIC BLOOD PRESSURE: 60 MMHG | WEIGHT: 258.7 LBS

## 2022-05-26 DIAGNOSIS — E66.01 MORBID OBESITY (H): ICD-10-CM

## 2022-05-26 DIAGNOSIS — Z00.00 ROUTINE GENERAL MEDICAL EXAMINATION AT A HEALTH CARE FACILITY: Primary | ICD-10-CM

## 2022-05-26 DIAGNOSIS — F33.1 MODERATE EPISODE OF RECURRENT MAJOR DEPRESSIVE DISORDER (H): ICD-10-CM

## 2022-05-26 DIAGNOSIS — Z31.69 PRE-CONCEPTION COUNSELING: ICD-10-CM

## 2022-05-26 DIAGNOSIS — Z12.4 CERVICAL CANCER SCREENING: ICD-10-CM

## 2022-05-26 PROCEDURE — 99395 PREV VISIT EST AGE 18-39: CPT | Performed by: NURSE PRACTITIONER

## 2022-05-26 PROCEDURE — G0123 SCREEN CERV/VAG THIN LAYER: HCPCS | Performed by: NURSE PRACTITIONER

## 2022-05-26 RX ORDER — BUPROPION HYDROCHLORIDE 300 MG/1
300 TABLET ORAL DAILY
Qty: 90 TABLET | Refills: 3 | Status: SHIPPED | OUTPATIENT
Start: 2022-05-26 | End: 2023-09-11

## 2022-05-26 RX ORDER — LEVONORGESTREL AND ETHINYL ESTRADIOL 0.15-0.03
1 KIT ORAL DAILY
Qty: 84 TABLET | Refills: 3 | Status: CANCELLED | OUTPATIENT
Start: 2022-05-26

## 2022-05-26 NOTE — PROGRESS NOTES
SUBJECTIVE:   CC: Abeba Toro is an 26 year old woman who presents for preventive health visit.     Depression:  Continues on sertraline and bupropion. She feels mood is well controlled, fairly even.  She and her partner have just started trying to conceive so she would like to talk about medications in pregnancy.  Have been trying for one month. No other supplements taken. Has not started PNV yet    HM:  Due for pap    Patient has been advised of split billing requirements and indicates understanding: Yes  Healthy Habits:     Getting at least 3 servings of Calcium per day:  NO    Bi-annual eye exam:  Yes    Dental care twice a year:  NO    Sleep apnea or symptoms of sleep apnea:  Daytime drowsiness and Sleep apnea    Diet:  Regular (no restrictions)    Frequency of exercise:  2-3 days/week    Duration of exercise:  15-30 minutes    Taking medications regularly:  Yes    Medication side effects:  None    PHQ-2 Total Score: 2    Additional concerns today:  No              Today's PHQ-2 Score:   PHQ-2 ( 1999 [employer]) 5/20/2022   Q1: Little interest or pleasure in doing things 1   Q2: Feeling down, depressed or hopeless 1   PHQ-2 Score 2   Q1: Little interest or pleasure in doing things Several days   Q2: Feeling down, depressed or hopeless Several days   PHQ-2 Score 2       Abuse: Current or Past (Physical, Sexual or Emotional) - No  Do you feel safe in your environment? Yes    Have you ever done Advance Care Planning? (For example, a Health Directive, POLST, or a discussion with a medical provider or your loved ones about your wishes): No, advance care planning information given to patient to review.  Patient plans to discuss their wishes with loved ones or provider.      Social History     Tobacco Use     Smoking status: Never Smoker     Smokeless tobacco: Never Used   Substance Use Topics     Alcohol use: Yes     If you drink alcohol do you typically have >3 drinks per day or >7 drinks per week?  "No    Alcohol Use 5/20/2022   Prescreen: >3 drinks/day or >7 drinks/week? No   No flowsheet data found.    Reviewed orders with patient.  Reviewed health maintenance and updated orders accordingly - Yes      Breast Cancer Screening:        History of abnormal Pap smear: NO - age 21-29 PAP every 3 years recommended     Reviewed and updated as needed this visit by clinical staff   Tobacco  Allergies  Meds                Reviewed and updated as needed this visit by Provider                       Review of Systems  CONSTITUTIONAL: NEGATIVE for fever, chills, change in weight  INTEGUMENTARU/SKIN: NEGATIVE for worrisome rashes, moles or lesions  EYES: NEGATIVE for vision changes or irritation  ENT: NEGATIVE for ear, mouth and throat problems  RESP: NEGATIVE for significant cough or SOB  BREAST: NEGATIVE for masses, tenderness or discharge  CV: NEGATIVE for chest pain, palpitations or peripheral edema  GI: NEGATIVE for nausea, abdominal pain, heartburn, or change in bowel habits  : NEGATIVE for unusual urinary or vaginal symptoms. Periods are regular.  MUSCULOSKELETAL: NEGATIVE for significant arthralgias or myalgia  NEURO: NEGATIVE for weakness, dizziness or paresthesias  PSYCHIATRIC: NEGATIVE for changes in mood or affect     OBJECTIVE:   /60 (BP Location: Left arm, Patient Position: Sitting, Cuff Size: Adult Large)   Pulse 84   Resp 20   Ht 1.721 m (5' 7.75\")   Wt 117.3 kg (258 lb 11.2 oz)   LMP 05/07/2022   SpO2 98%   BMI 39.63 kg/m    Physical Exam  GENERAL: healthy, alert and no distress  EYES: Eyes grossly normal to inspection, PERRL and conjunctivae and sclerae normal  HENT: ear canals and TM's normal, nose and mouth without ulcers or lesions  NECK: no adenopathy, no asymmetry, masses, or scars and thyroid normal to palpation  RESP: lungs clear to auscultation - no rales, rhonchi or wheezes  BREAST: normal without masses, tenderness or nipple discharge and no palpable axillary masses or " adenopathy  CV: regular rate and rhythm, normal S1 S2, no S3 or S4, no murmur, click or rub, no peripheral edema and peripheral pulses strong  ABDOMEN: soft, nontender, no hepatosplenomegaly, no masses and bowel sounds normal   (female): normal female external genitalia, normal urethral meatus, vaginal mucosa pink, moist, well rugated, and normal cervix/adnexa/uterus without masses or discharge  MS: no gross musculoskeletal defects noted, no edema  SKIN: no suspicious lesions or rashes  NEURO: Normal strength and tone, mentation intact and speech normal  PSYCH: mentation appears normal, affect normal/bright    Diagnostic Test Results:  Labs reviewed in Epic    ASSESSMENT/PLAN:   1. Routine general medical examination at a health care facility  Not fasting for labs, diabetes screen normal one year ago. Pap today, if normal repeat in three years.     2. Moderate episode of recurrent major depressive disorder (H)  She feels depression has been well controlled with sertraline and bupropion.  Will continue current doses   - sertraline (ZOLOFT) 50 MG tablet; Take 1 tablet (50 mg) by mouth daily  Dispense: 90 tablet; Refill: 3  - buPROPion (WELLBUTRIN XL) 300 MG 24 hr tablet; Take 1 tablet (300 mg) by mouth daily  Dispense: 90 tablet; Refill: 3    3. Pre-conception counseling  She and her partner recently began trying to conceive.  Unsure about regularity of cycles d/t recently stopping OCP. Counseled on general recommendations, start PNV. We reviewed the safety profiles of sertraline and bupropion in pregnancy and she will think about this.  Recommended at a minimum continuing sertraline in pregnancy    4. Obesity (H)  Counseled on exercise and diet.      Patient has been advised of split billing requirements and indicates understanding: Yes    COUNSELING:  Reviewed preventive health counseling, as reflected in patient instructions    Estimated body mass index is 39.63 kg/m  as calculated from the following:    Height  "as of this encounter: 1.721 m (5' 7.75\").    Weight as of this encounter: 117.3 kg (258 lb 11.2 oz).    Weight management plan: Discussed healthy diet and exercise guidelines    She reports that she has never smoked. She has never used smokeless tobacco.      Counseling Resources:  ATP IV Guidelines  Pooled Cohorts Equation Calculator  Breast Cancer Risk Calculator  BRCA-Related Cancer Risk Assessment: FHS-7 Tool  FRAX Risk Assessment  ICSI Preventive Guidelines  Dietary Guidelines for Americans, 2010  USDA's MyPlate  ASA Prophylaxis  Lung CA Screening    Allison Ramsey, [facility]  "

## 2022-05-31 LAB
BKR LAB AP GYN ADEQUACY: NORMAL
BKR LAB AP GYN INTERPRETATION: NORMAL
BKR LAB AP HPV REFLEX: NO
BKR LAB AP LMP: NORMAL
BKR LAB AP PREVIOUS ABNORMAL: NORMAL
PATH REPORT.COMMENTS IMP SPEC: NORMAL
PATH REPORT.COMMENTS IMP SPEC: NORMAL
PATH REPORT.RELEVANT HX SPEC: NORMAL

## 2022-07-01 ENCOUNTER — TELEPHONE (OUTPATIENT)
Dept: FAMILY MEDICINE | Facility: CLINIC | Age: 27
End: 2022-07-01

## 2022-07-01 NOTE — TELEPHONE ENCOUNTER
Called and scheduled a pregnancy confirmation appt    [facility] [place] 7.1.22

## 2022-07-01 NOTE — TELEPHONE ENCOUNTER
,  Please call patient to assist with scheduling for first OB appointment per message below.     Thank you    UZAIR Saez, RN  [facility]

## 2022-07-01 NOTE — TELEPHONE ENCOUNTER
Pt was transferred to the [facility] line, she wants to schedule her first OB appt with Bre Alberto. Pls call pt to schedule. Writer cannot schedule for provider.

## 2022-07-18 ENCOUNTER — OFFICE VISIT (OUTPATIENT)
Dept: FAMILY MEDICINE | Facility: CLINIC | Age: 27
End: 2022-07-18
Payer: COMMERCIAL

## 2022-07-18 VITALS
HEIGHT: 68 IN | TEMPERATURE: 98 F | DIASTOLIC BLOOD PRESSURE: 78 MMHG | OXYGEN SATURATION: 98 % | HEART RATE: 83 BPM | WEIGHT: 267.25 LBS | BODY MASS INDEX: 40.5 KG/M2 | SYSTOLIC BLOOD PRESSURE: 116 MMHG | RESPIRATION RATE: 16 BRPM

## 2022-07-18 DIAGNOSIS — F33.1 MODERATE EPISODE OF RECURRENT MAJOR DEPRESSIVE DISORDER (H): ICD-10-CM

## 2022-07-18 DIAGNOSIS — Z34.90 EARLY STAGE OF PREGNANCY: Primary | ICD-10-CM

## 2022-07-18 DIAGNOSIS — O99.211 OBESITY AFFECTING PREGNANCY IN FIRST TRIMESTER: ICD-10-CM

## 2022-07-18 DIAGNOSIS — N92.6 MISSED PERIOD: ICD-10-CM

## 2022-07-18 LAB — HCG UR QL: POSITIVE

## 2022-07-18 PROCEDURE — 99213 OFFICE O/P EST LOW 20 MIN: CPT | Performed by: FAMILY MEDICINE

## 2022-07-18 PROCEDURE — 81025 URINE PREGNANCY TEST: CPT | Performed by: FAMILY MEDICINE

## 2022-07-18 RX ORDER — FOLIC ACID 1 MG/1
1 TABLET ORAL DAILY
Qty: 90 TABLET | Refills: 3 | Status: SHIPPED | OUTPATIENT
Start: 2022-07-18 | End: 2023-03-03

## 2022-07-18 NOTE — PROGRESS NOTES
"  Assessment & Plan     Early stage of pregnancy: could not see fetus today so will get ultrasound to confirm viability.   - US OB < 14 Weeks Single      Missed period  - HCG qualitative urine    Moderate episode of recurrent major depressive disorder (H): mood is good right now. Encouraged her to continue both sertraline and bupropion since she has done well on this combination and data shows a good safety profile for both.     Obesity affecting pregnancy in first trimester: discussed recommendation for extra folic acid   - folic acid (FOLVITE) 1 MG tablet  Dispense: 90 tablet; Refill: 3        No follow-ups on file.    Bre Alberto MD  [facility] VALERIA sU is a 26 year old, presenting for the following health issues:  Pregnancy Test      History of Present Illness       Reason for visit:  Pregnancy    She eats 0-1 servings of fruits and vegetables daily.She consumes 1 sweetened beverage(s) daily.She exercises with enough effort to increase her heart rate 10 to 19 minutes per day.  She exercises with enough effort to increase her heart rate 4 days per week. She is missing 1 dose(s) of medications per week.  She is not taking prescribed medications regularly due to remembering to take.    LMP 5/23/22. This makes 2/27, 8 weeks. A little nauseous, not throwing up. Taking chula gummies. Taking prenatal gummies. No cramping or bleeding.     Was trying to conceive. waqas Renee, getting  in August.          Objective    /78   Pulse 83   Temp 98  F (36.7  C) (Oral)   Resp 16   Ht 1.721 m (5' 7.75\")   Wt 121.2 kg (267 lb 4 oz)   LMP 06/27/2022 (Approximate)   SpO2 98%   Breastfeeding No   BMI 40.94 kg/m    Body mass index is 40.94 kg/m .  Physical Exam   GENERAL: healthy, alert and no distress  MS: no gross musculoskeletal defects noted, no edema  Abd: obese. Not able to clearly view fetus with bedside ultrasound.   SKIN: no suspicious lesions or " rashes  NEURO: Normal strength and tone, mentation intact and speech normal  PSYCH: mentation appears normal, affect normal/bright              .  ..

## 2022-07-28 ENCOUNTER — HOSPITAL ENCOUNTER (OUTPATIENT)
Dept: ULTRASOUND IMAGING | Facility: HOSPITAL | Age: 27
Discharge: HOME | End: 2022-07-28
Attending: FAMILY MEDICINE | Admitting: FAMILY MEDICINE
Payer: COMMERCIAL

## 2022-07-28 DIAGNOSIS — Z34.90 EARLY STAGE OF PREGNANCY: ICD-10-CM

## 2022-07-28 PROCEDURE — 76801 OB US < 14 WKS SINGLE FETUS: CPT

## 2022-08-26 ENCOUNTER — VIRTUAL VISIT (OUTPATIENT)
Dept: FAMILY MEDICINE | Facility: CLINIC | Age: 27
End: 2022-08-26
Payer: COMMERCIAL

## 2022-08-26 ENCOUNTER — TELEPHONE (OUTPATIENT)
Dept: FAMILY MEDICINE | Facility: CLINIC | Age: 27
End: 2022-08-26

## 2022-08-26 DIAGNOSIS — Z34.01 ENCOUNTER FOR SUPERVISION OF NORMAL FIRST PREGNANCY IN FIRST TRIMESTER: Primary | ICD-10-CM

## 2022-08-26 DIAGNOSIS — Z30.9 CONTRACEPTION MANAGEMENT: ICD-10-CM

## 2022-08-26 DIAGNOSIS — Z59.71 INSURANCE COVERAGE PROBLEMS: ICD-10-CM

## 2022-08-26 PROCEDURE — 99207 PR PRENATAL VISIT: CPT | Performed by: FAMILY MEDICINE

## 2022-08-26 RX ORDER — LEVONORGESTREL AND ETHINYL ESTRADIOL 0.15-0.03
KIT ORAL
Qty: 84 TABLET | Refills: 1 | OUTPATIENT
Start: 2022-08-26

## 2022-08-26 NOTE — PROGRESS NOTES
Abeba is a 26 year old who is being evaluated via a billable telephone visit.      What phone number would you like to be contacted at? [phone]  How would you like to obtain your AVS? MyChart    Assessment & Plan     Encounter for supervision of normal first pregnancy in first trimester: she will come in for a lab only appt to get prenatal labs. Also getting first trimester screen with [facility]. Will check to make sure THONG is resolved, too.   - ABO/Rh type and screen  - Hepatitis B surface antigen  - CBC with platelets  - HIV Antigen Antibody Combo  - Rubella Antibody IgG  - Treponema Abs w Reflex to RPR and Titer  - Urine Culture Aerobic Bacterial  - Urine Drugs of Abuse Screen Panel 13  - Ob/Gyn Referral  - Primary Care - Care Coordination Referral    Insurance coverage problems: I have never run into an issue where a patient's insurance wants them to have an ultrasound at the same time as their prenatal visit in order for it to be covered. I have referred to care coordination for her to get some assistance with her insurance coverage since she should NOT have been billed for this prenatal ultrasound.   - Primary Care - Care Coordination Referral        No follow-ups on file.    Bre Alberto MD  [facility]   Abeba is a 26 year old, presenting for the following health issues:  Prenatal Care      HPI     She is doing well. Nausea has resolved. She has not had any bleeding or cramping. About 10 weeks pregnant now.     She got a bill for her first ultrasound because her insurance told her that in order for it to be covered, it needed to be associated with a prenatal office visit or done at the same time as one.     She is getting  tomorrow! Really excited.       Objective           Vitals:  No vitals were obtained today due to virtual visit.    Physical Exam   healthy, alert and no distress  PSYCH: Alert and oriented times 3; coherent speech, normal   rate and  volume, able to articulate logical thoughts, able   to abstract reason, no tangential thoughts, no hallucinations   or delusions  Her affect is normal  RESP: No cough, no audible wheezing, able to talk in full sentences  Remainder of exam unable to be completed due to telephone visits      Phone call duration: 13 minutes    .  ..

## 2022-08-27 NOTE — TELEPHONE ENCOUNTER
ALTAVERA 0.15-30 MG-MCG tablet (Discontinued) 84 tablet 1 2/24/2022 7/18/2022 No   Sig: TAKE 1 TABLET BY MOUTH EVERY DAY   Sent to pharmacy as: Altavera 0.15-30 MG-MCG Oral Tablet (levonorgestrel-ethinyl estradiol)   Class: E-Prescribe   Reason for Discontinue: Therapy completed   Order: [id number]   E-Prescribing Status: Receipt confirmed by pharmacy (2/24/2022 10:04 PM CST)

## 2022-08-28 LAB
ABO/RH(D): NORMAL
ANTIBODY SCREEN: NEGATIVE
SPECIMEN EXPIRATION DATE: NORMAL

## 2022-08-29 ENCOUNTER — LAB (OUTPATIENT)
Dept: LAB | Facility: CLINIC | Age: 27
End: 2022-08-29
Payer: COMMERCIAL

## 2022-08-29 ENCOUNTER — PATIENT OUTREACH (OUTPATIENT)
Dept: CARE COORDINATION | Facility: CLINIC | Age: 27
End: 2022-08-29

## 2022-08-29 DIAGNOSIS — Z11.3 SCREENING FOR STDS (SEXUALLY TRANSMITTED DISEASES): ICD-10-CM

## 2022-08-29 DIAGNOSIS — Z11.4 SCREENING FOR HIV (HUMAN IMMUNODEFICIENCY VIRUS): ICD-10-CM

## 2022-08-29 DIAGNOSIS — Z11.59 NEED FOR HEPATITIS C SCREENING TEST: ICD-10-CM

## 2022-08-29 DIAGNOSIS — Z34.01 ENCOUNTER FOR SUPERVISION OF NORMAL FIRST PREGNANCY IN FIRST TRIMESTER: ICD-10-CM

## 2022-08-29 LAB
AMPHETAMINES UR QL: NOT DETECTED
BARBITURATES UR QL SCN: NOT DETECTED
BENZODIAZ UR QL SCN: NOT DETECTED
BUPRENORPHINE UR QL: NOT DETECTED
CANNABINOIDS UR QL: NOT DETECTED
COCAINE UR QL SCN: NOT DETECTED
D-METHAMPHET UR QL: NOT DETECTED
ERYTHROCYTE [DISTWIDTH] IN BLOOD BY AUTOMATED COUNT: 12.8 % (ref 10–15)
HBV SURFACE AG SERPL QL IA: NONREACTIVE
HCT VFR BLD AUTO: 38.7 % (ref 35–47)
HCV AB SERPL QL IA: NONREACTIVE
HGB BLD-MCNC: 12.6 G/DL (ref 11.7–15.7)
HIV 1+2 AB+HIV1 P24 AG SERPL QL IA: NONREACTIVE
MCH RBC QN AUTO: 28.8 PG (ref 26.5–33)
MCHC RBC AUTO-ENTMCNC: 32.6 G/DL (ref 31.5–36.5)
MCV RBC AUTO: 89 FL (ref 78–100)
METHADONE UR QL SCN: NOT DETECTED
OPIATES UR QL SCN: NOT DETECTED
OXYCODONE UR QL SCN: NOT DETECTED
PCP UR QL SCN: NOT DETECTED
PLATELET # BLD AUTO: 314 10E3/UL (ref 150–450)
PROPOXYPH UR QL: NOT DETECTED
RBC # BLD AUTO: 4.37 10E6/UL (ref 3.8–5.2)
T PALLIDUM AB SER QL: NONREACTIVE
TRICYCLICS UR QL SCN: NOT DETECTED
WBC # BLD AUTO: 7.8 10E3/UL (ref 4–11)

## 2022-08-29 PROCEDURE — 87340 HEPATITIS B SURFACE AG IA: CPT

## 2022-08-29 PROCEDURE — 87389 HIV-1 AG W/HIV-1&-2 AB AG IA: CPT

## 2022-08-29 PROCEDURE — 86850 RBC ANTIBODY SCREEN: CPT

## 2022-08-29 PROCEDURE — 87491 CHLMYD TRACH DNA AMP PROBE: CPT

## 2022-08-29 PROCEDURE — 87591 N.GONORRHOEAE DNA AMP PROB: CPT

## 2022-08-29 PROCEDURE — 86762 RUBELLA ANTIBODY: CPT

## 2022-08-29 PROCEDURE — 36415 COLL VENOUS BLD VENIPUNCTURE: CPT

## 2022-08-29 PROCEDURE — 86780 TREPONEMA PALLIDUM: CPT

## 2022-08-29 PROCEDURE — 86803 HEPATITIS C AB TEST: CPT

## 2022-08-29 PROCEDURE — 85027 COMPLETE CBC AUTOMATED: CPT

## 2022-08-29 PROCEDURE — 87086 URINE CULTURE/COLONY COUNT: CPT

## 2022-08-29 PROCEDURE — 86900 BLOOD TYPING SEROLOGIC ABO: CPT

## 2022-08-29 PROCEDURE — 86901 BLOOD TYPING SEROLOGIC RH(D): CPT

## 2022-08-29 PROCEDURE — 80306 DRUG TEST PRSMV INSTRMNT: CPT

## 2022-08-29 NOTE — PROGRESS NOTES
"Clinic Care Coordination Contact  [facility]/Voicemail    Clinical Data: Care Coordinator Outreach  Outreach attempted x 1.  Left message on patient's voicemail with call back information and requested return call.    Chart Review: Referral from PCP  Reason for Referral: Financial Support  Financial Support: Insurance     Comments: Patient had an early OB ultrasound at 6 weeks and her insurance did not cover it because they told her it had to be \"associated with a doctor's visit\" or at the same location as her doctor's visit. I did have a visit with her, just not on the same day or the same place. I have NEVER run into this issue- prenatal ultrasounds are always typically covered. Maybe they need the message that we do not have an ultrasound machine at our office? Please send this to the financial worker and have them help reach out to her insurance to discuss. Thank you.    Plan: Care Coordinator will try to reach patient again in 1-2 business days.    Rupa Greene  Clinic Care Coordination  [facility]    Phone: [phone]          "

## 2022-08-29 NOTE — PROGRESS NOTES
"Clinic Care Coordination Contact  Community Health Worker Initial Outreach    CHW Initial Information Gathering:  Referral Source: PCP  Type of residence:: Apartment  Community Resources: None  Supplies Currently Used at Home: None  Equipment Currently Used at Home: none  Informal Support system:: Spouse, Family  No PCP office visit in Past Year: No  Transportation means:: Accessible car  CHW Additional Questions  If ED/Hospital discharge, follow-up appointment scheduled as recommended?: N/A  Medication changes made following ED/Hospital discharge?: N/A  MyChart active?: No  Patient agreeable to assistance with activating MyChart?: No    Patient accepts CC: Yes. Patient scheduled for assessment with SHERRILL Lorenzana on 9/8/22  at 2PM. Patient noted desire to discuss CC and resources for support with clarifying coverage for ultrasound.     Chart Review: Referral from PCP  Reason for Referral: Financial Support  Financial Support: Insurance     Comments: Patient had an early OB ultrasound at 6 weeks and her insurance did not cover it because they told her it had to be \"associated with a doctor's visit\" or at the same location as her doctor's visit. I did have a visit with her, just not on the same day or the same place. I have NEVER run into this issue- prenatal ultrasounds are always typically covered. Maybe they need the message that we do not have an ultrasound machine at our office? Please send this to the financial worker and have them help reach out to her insurance to discuss. Thank you.    Rupa Greene  Clinic Care Coordination  [facility]    Phone: [phone]        "

## 2022-08-30 LAB
C TRACH DNA SPEC QL NAA+PROBE: NEGATIVE
N GONORRHOEA DNA SPEC QL NAA+PROBE: NEGATIVE
RUBV IGG SERPL QL IA: 3.07 INDEX
RUBV IGG SERPL QL IA: POSITIVE

## 2022-08-31 LAB — BACTERIA UR CULT: NORMAL

## 2022-09-02 ENCOUNTER — TRANSFERRED RECORDS (OUTPATIENT)
Dept: HEALTH INFORMATION MANAGEMENT | Facility: CLINIC | Age: 27
End: 2022-09-02

## 2022-09-04 ENCOUNTER — MYC MEDICAL ADVICE (OUTPATIENT)
Dept: FAMILY MEDICINE | Facility: CLINIC | Age: 27
End: 2022-09-04

## 2022-09-06 NOTE — TELEPHONE ENCOUNTER
I spoke with patient to acknowledge that her message was sent to [place] and that I would work to have a member of Dr. Alberto's team reach out to her today or first thing tomorrow morning.    Radha Booker M.A., LPN  Clinic Manager  [facility] [place] [facility]

## 2022-09-06 NOTE — TELEPHONE ENCOUNTER
Pt called back about her miscarriage and would like to talk to provider as she is worried and don't know if she should be seen or not. Please follow back up with pt at your earliest convenient to advise. Thank you.

## 2022-09-06 NOTE — TELEPHONE ENCOUNTER
I spoke with Abeba on the phone.  She reports that she had an ultrasound at [facility] last week that showed a miscarriage.  She was not bleeding at the time, but later that same day when she got home she Started to have some light bleeding.  She is mostly wondering when she needs to worry or get more care.  Right now she has a follow-up appointment at [facility] will be scheduled for 9/16/2022.    We discussed potential natural course of miscarriage.  There is a good chance of passing tissue spontaneously given that she is already bleeding, although this is not a certainty.  Current management options include observation/expectant management, medical management, or surgical management (dilation and curettage).  She is comfortable waiting for now.  We reviewed signs and symptoms for which she should seek prompt care including heavy bleeding, signs of infection, severe pain, or generally just concerned and would like reevaluation.    For now, the plan will be to see how things go naturally and she will follow-up with [facility] next week as scheduled, unless something comes up before then.  She already has a work note from [facility] [email or web address].    Routing to Dr Alberto as WALLACE

## 2022-09-07 ENCOUNTER — MYC MEDICAL ADVICE (OUTPATIENT)
Dept: FAMILY MEDICINE | Facility: CLINIC | Age: 27
End: 2022-09-07

## 2022-09-08 ENCOUNTER — PATIENT OUTREACH (OUTPATIENT)
Dept: NURSING | Facility: CLINIC | Age: 27
End: 2022-09-08

## 2022-09-08 DIAGNOSIS — Z34.01 ENCOUNTER FOR SUPERVISION OF NORMAL FIRST PREGNANCY IN FIRST TRIMESTER: ICD-10-CM

## 2022-09-08 DIAGNOSIS — Z59.71 INSURANCE COVERAGE PROBLEMS: ICD-10-CM

## 2022-09-08 SDOH — ECONOMIC STABILITY: TRANSPORTATION INSECURITY
IN THE PAST 12 MONTHS, HAS THE LACK OF TRANSPORTATION KEPT YOU FROM MEDICAL APPOINTMENTS OR FROM GETTING MEDICATIONS?: NO

## 2022-09-08 SDOH — ECONOMIC STABILITY: FOOD INSECURITY: WITHIN THE PAST 12 MONTHS, THE FOOD YOU BOUGHT JUST DIDN'T LAST AND YOU DIDN'T HAVE MONEY TO GET MORE.: SOMETIMES TRUE

## 2022-09-08 SDOH — ECONOMIC STABILITY: TRANSPORTATION INSECURITY
IN THE PAST 12 MONTHS, HAS LACK OF TRANSPORTATION KEPT YOU FROM MEETINGS, WORK, OR FROM GETTING THINGS NEEDED FOR DAILY LIVING?: NO

## 2022-09-08 SDOH — ECONOMIC STABILITY: INCOME INSECURITY: IN THE LAST 12 MONTHS, WAS THERE A TIME WHEN YOU WERE NOT ABLE TO PAY THE MORTGAGE OR RENT ON TIME?: NO

## 2022-09-08 SDOH — ECONOMIC STABILITY: FOOD INSECURITY: WITHIN THE PAST 12 MONTHS, YOU WORRIED THAT YOUR FOOD WOULD RUN OUT BEFORE YOU GOT MONEY TO BUY MORE.: SOMETIMES TRUE

## 2022-09-08 ASSESSMENT — ACTIVITIES OF DAILY LIVING (ADL): DEPENDENT_IADLS:: INDEPENDENT

## 2022-09-08 NOTE — PROGRESS NOTES
Clinic Care Coordination Contact     Clinic Care Coordination Contact  OUTREACH    Referral Information: question about ins. coverage for ultrasound/ support for mscarriage  Referral Source: PCP    Primary Diagnosis: Behavioral Health  . History of Anxiety/ depression    Chief Complaint   Patient presents with     Clinic Care Coordination - Initial             Universal Utilization:   Clinic Utilization  Difficulty keeping appointments:: No  Compliance Concerns: No  No-Show Concerns: No  No PCP office visit in Past Year: No  Utilization    Hospital Admissions  0             ED Visits  0             No Show Count (past year)  0                Current as of: 9/7/2022 12:03 PM            Clinical Concerns:  Current Medical Concerns:    Patient Active Problem List   Diagnosis     Moderate episode of recurrent major depressive disorder (H)     PMDD (premenstrual dysphoric disorder)     Obesity (H)    . Pregnancy / miscarriage    Current Behavioral Concerns: depression anxiety. In process of miscarriage. No thoughts of self harm  .  is supportive, but working long hours.  Pt. would like to connect with therapist again    Education Provided to patient: Introduction to  CC, FCC for scheduling a therapist, [facility] zoom support group for miscarriage, crisis numbers, Fare for all and food shelves.     Health Maintenance Reviewed: Up to date      Medication Management:  Medication review status:  Not done       Functional Status:  Dependent ADLs:: Independent  Dependent IADLs:: Independent  Bed or wheelchair confined:: No  Mobility Status: Independent  Any fall with injury in the past year?: No    Living Situation:  Current living arrangement:: I live in a private home with family (with )  Type of residence:: Apartment    Lifestyle & Psychosocial Needs:  Living with  who works long hours. He is supportive, Both are employed.  She works at a [employer]. Her insurance has declined to pay for  ultrasound as no referral was received.   Social Determinants of Health     Tobacco Use: Low Risk      Smoking Tobacco Use: Never Smoker     Smokeless Tobacco Use: Never Used   Alcohol Use: Not on file   Financial Resource Strain: Not on file   Food Insecurity: Not on file   Transportation Needs: Not on file   Physical Activity: Not on file   Stress: Not on file   Social Connections: Not on file   Intimate Partner Violence: Not on file   Depression: Not at risk     PHQ-2 Score: 2   Housing Stability: Not on file     Diet:: Regular  Inadequate nutrition (GOAL):: No  Tube Feeding: No  Inadequate activity/exercise (GOAL):: No  Significant changes in sleep pattern (GOAL): No  Transportation means:: Accessible car     Gnosticist or spiritual beliefs that impact treatment:: No  Mental health DX:: Yes (depression. anxiety)  Mental health DX how managed:: Medication  Mental health management concern (GOAL):: Yes (would like a therapist)  Chemical Dependency Status: No Current Concerns  Informal Support system:: Spouse, Family        Resources and Interventions:  Current Resources: none     Community Resources: None  Supplies Currently Used at Home: None  Equipment Currently Used at Home: none  Employment Status: employed full-time         Advance Care Plan/Directive  Advanced Care Plans/Directives on file:: No  Advanced Care Plan/Directive Status: Not Applicable    Referrals Placed: None, resources for FCC/therapsit, pregnancy loss support and food programs provided         Care Plan:  Care Plan: connect with a therapist and Pregnancy loss support     Problem: HP GENERAL PROBLEM     Goal: connect with a therapist and Pregnancy loss support     Start Date: 9/8/2022 Expected End Date: 10/13/2022    Note:     Barriers: awareness or resources  Strengths: willing to see out support  Patient expressed understanding of goal: yes  Action steps to achieve this goal:  1. I will call [facility] to schedule a therapy appt  2. I will contact  [facility] for pregnancy support group  3. I will call SW and provider with any questions.    9/8/22 in process of miscarrying                    Care Plan: Community Resources/ food programs     Problem: need for help with food occasionally     Onset Date: 9/8/2022    Goal: Identify Options for Meal Assistance                 Problem: Reliable food source     Goal: Establish Options for Affordable Food Sources     Note:     Barriers: timing of pay checks  Strengths: willing to seek out resources  Patient expressed understanding of goal: yes  Action steps to achieve this goal:  1. I will research Fare For All  locations and  dates.  2. I will use a food shelf if needed.  3. I will call SW or CHW with any questions.                  Problem: Insufficient In-home support                 Patient/Caregiver understanding: good    Outreach Frequency: monthly  Future Appointments              Today SPRO CCC JOLEEN [facility] [place], [facility] S[facility]    In 2 weeks Bre Alberto MD [facility] [place], [facility]    In 1 month Bre Alberto MD [facility] [place], [facility] SPRO    In 2 months Bre Alberto MD [facility] [place], [facility] S[facility]    In 3 months Bre Alberto MD [facility] [place], [facility] S[facility]    In 4 months Bre Alberto MD [facility] [place], [facility]    In 4 months Bre Alberto MD [facility] [place], [facility]    In 4 months Bre Alberto MD [facility] [place], [facility] S[facility]    In 5 months Bre Alberto MD [facility] [place], [facility] S[facility]    In 5 months Bre Alberto MD [facility] [place], [facility] S[facility]    In 6 months Bre Alberto MD [facility] [place], [facility] S[facility]    In 6 months Bre Alberto MD [facility] [place], [facility] SPRO    In 6 months Bre Alberto MD [facility] [place], [facility] S[facility]    In 6 months Bre Alberto MD [facility]  [facility] [place] SPRO          Plan: Pt will call FCC to schedule with a therapsit.. Will connect with [facility] for a Zoom Miscarriage support group. Will look on line for detains about Fare For all locations.   referral to FRW to assist with bill.   Lead JOLEEN CC will follow up in 2 weeks to check on progress.      FRANCHESKA Deleon/ JANAE Sadler    [facility]   Care Coordination  [facility]  9/8/2022 1:59 PM

## 2022-09-12 ENCOUNTER — PATIENT OUTREACH (OUTPATIENT)
Dept: CARE COORDINATION | Facility: CLINIC | Age: 27
End: 2022-09-12

## 2022-09-12 NOTE — PROGRESS NOTES
Clinic Care Coordination Contact  Program: Medical Bill   County: [facility] Case #:  [facility] Worker:   [place]anya[place] #:   Subscriber #:   Renewal:  Date Applied:     GÓMEZ Outreach:  9/12/2022: FRW called patient and advised her she will need to leave a message for her provider asking them to please place a referral for the ultrasound so it can be billed out. [facility] will not do a write off on a charge like this as the referral can be placed after and billed. FRW will follow up in 3 weeks.     Health Insurance:    [facility]     Referral/Screening:    [facility] Benefits   Is patient requesting help applying for [facility] benefits? No     Insurance:   [place]? No   Is this an insurance renewal? No   Is this a new insurance application request? No     OTHER   Is this a mila care application? Yes   Any other information for the FRW? ultrasound coverage was denied by insuance because no referral was sent.  can you help with this?

## 2022-09-14 ENCOUNTER — PATIENT OUTREACH (OUTPATIENT)
Dept: CARE COORDINATION | Facility: CLINIC | Age: 27
End: 2022-09-14

## 2022-09-18 ENCOUNTER — HOSPITAL ENCOUNTER (EMERGENCY)
Facility: HOSPITAL | Age: 27
Discharge: HOME | End: 2022-09-18
Attending: EMERGENCY MEDICINE | Admitting: EMERGENCY MEDICINE
Payer: COMMERCIAL

## 2022-09-18 VITALS
SYSTOLIC BLOOD PRESSURE: 102 MMHG | OXYGEN SATURATION: 98 % | HEART RATE: 79 BPM | RESPIRATION RATE: 16 BRPM | TEMPERATURE: 98.1 F | BODY MASS INDEX: 39.83 KG/M2 | DIASTOLIC BLOOD PRESSURE: 57 MMHG | WEIGHT: 260 LBS

## 2022-09-18 DIAGNOSIS — O03.9 MISCARRIAGE: ICD-10-CM

## 2022-09-18 LAB
ABO/RH(D): NORMAL
ANION GAP SERPL CALCULATED.3IONS-SCNC: 7 MMOL/L (ref 5–18)
ANTIBODY SCREEN: NEGATIVE
BUN SERPL-MCNC: 10 MG/DL (ref 8–22)
CALCIUM SERPL-MCNC: 9 MG/DL (ref 8.5–10.5)
CHLORIDE BLD-SCNC: 104 MMOL/L (ref 98–107)
CO2 SERPL-SCNC: 26 MMOL/L (ref 22–31)
CREAT SERPL-MCNC: 0.73 MG/DL (ref 0.6–1.1)
ERYTHROCYTE [DISTWIDTH] IN BLOOD BY AUTOMATED COUNT: 12.3 % (ref 10–15)
GFR SERPL CREATININE-BSD FRML MDRD: >90 ML/MIN/1.73M2
GLUCOSE BLD-MCNC: 92 MG/DL (ref 70–125)
HCG SERPL-ACNC: 116 MLU/ML (ref 0–4)
HCT VFR BLD AUTO: 34.5 % (ref 35–47)
HGB BLD-MCNC: 11.2 G/DL (ref 11.7–15.7)
HOLD SPECIMEN: NORMAL
MCH RBC QN AUTO: 29.1 PG (ref 26.5–33)
MCHC RBC AUTO-ENTMCNC: 32.5 G/DL (ref 31.5–36.5)
MCV RBC AUTO: 90 FL (ref 78–100)
PLATELET # BLD AUTO: 311 10E3/UL (ref 150–450)
POTASSIUM BLD-SCNC: 4 MMOL/L (ref 3.5–5)
RBC # BLD AUTO: 3.85 10E6/UL (ref 3.8–5.2)
SODIUM SERPL-SCNC: 137 MMOL/L (ref 136–145)
SPECIMEN EXPIRATION DATE: NORMAL
WBC # BLD AUTO: 12.6 10E3/UL (ref 4–11)

## 2022-09-18 PROCEDURE — 250N000013 HC RX MED GY IP 250 OP 250 PS 637: Performed by: OBSTETRICS & GYNECOLOGY

## 2022-09-18 PROCEDURE — 96374 THER/PROPH/DIAG INJ IV PUSH: CPT

## 2022-09-18 PROCEDURE — 86901 BLOOD TYPING SEROLOGIC RH(D): CPT | Performed by: STUDENT IN AN ORGANIZED HEALTH CARE EDUCATION/TRAINING PROGRAM

## 2022-09-18 PROCEDURE — 80048 BASIC METABOLIC PNL TOTAL CA: CPT | Performed by: STUDENT IN AN ORGANIZED HEALTH CARE EDUCATION/TRAINING PROGRAM

## 2022-09-18 PROCEDURE — 250N000011 HC RX IP 250 OP 636: Performed by: EMERGENCY MEDICINE

## 2022-09-18 PROCEDURE — 84702 CHORIONIC GONADOTROPIN TEST: CPT | Performed by: STUDENT IN AN ORGANIZED HEALTH CARE EDUCATION/TRAINING PROGRAM

## 2022-09-18 PROCEDURE — 88305 TISSUE EXAM BY PATHOLOGIST: CPT | Mod: TC | Performed by: OBSTETRICS & GYNECOLOGY

## 2022-09-18 PROCEDURE — 99285 EMERGENCY DEPT VISIT HI MDM: CPT | Mod: 25

## 2022-09-18 PROCEDURE — 85027 COMPLETE CBC AUTOMATED: CPT | Performed by: STUDENT IN AN ORGANIZED HEALTH CARE EDUCATION/TRAINING PROGRAM

## 2022-09-18 PROCEDURE — 36415 COLL VENOUS BLD VENIPUNCTURE: CPT | Performed by: STUDENT IN AN ORGANIZED HEALTH CARE EDUCATION/TRAINING PROGRAM

## 2022-09-18 RX ORDER — MORPHINE SULFATE 4 MG/ML
4 INJECTION, SOLUTION INTRAMUSCULAR; INTRAVENOUS ONCE
Status: COMPLETED | OUTPATIENT
Start: 2022-09-18 | End: 2022-09-18

## 2022-09-18 RX ORDER — MISOPROSTOL 200 UG/1
800 TABLET ORAL ONCE
Status: COMPLETED | OUTPATIENT
Start: 2022-09-18 | End: 2022-09-18

## 2022-09-18 RX ADMIN — MORPHINE SULFATE 4 MG: 4 INJECTION INTRAVENOUS at 20:16

## 2022-09-18 RX ADMIN — MISOPROSTOL 800 MCG: 200 TABLET ORAL at 23:03

## 2022-09-18 ASSESSMENT — ACTIVITIES OF DAILY LIVING (ADL)
ADLS_ACUITY_SCORE: 35
ADLS_ACUITY_SCORE: 35

## 2022-09-19 NOTE — DISCHARGE INSTRUCTIONS
Please follow-up with Dr. Macdonald on Friday, as previously scheduled.    Return to the ER for worsening symptoms, worsening vaginal bleeding (if you soak one pad per hour for 3 hours in a row), if you pass out or feel that you might, persistent vomiting, fever or other concerns.    Nothing in the vagina (no sex, fingers, tampons, etc) until you get clearance from Dr. Macdonald.

## 2022-09-19 NOTE — ED PROVIDER NOTES
Emergency Department Encounter     Evaluation Date & Time:   2022  7:20 PM    CHIEF COMPLAINT:  Miscarriage      Triage Note:   Pt was told 2 weeks ago she is having a miscarriage, pt started started at 1100 today, bleeding and clots.  Feeling dizzy.   States going through 6 pads an hour.      Triage Assessment     Row Name 22 [id number]       Triage Assessment (Adult)    Airway WDL WDL       Respiratory WDL    Respiratory WDL WDL       Skin Circulation/Temperature WDL    Skin Circulation/Temperature WDL WDL       Cardiac WDL    Cardiac WDL WDL       Peripheral/Neurovascular WDL    Peripheral Neurovascular WDL WDL       Cognitive/Neuro/Behavioral WDL    Cognitive/Neuro/Behavioral WDL WDL                    Impression and Plan       FINAL IMPRESSION:    ICD-10-CM    1. Miscarriage  O03.9          ED COURSE & MEDICAL DECISION MAKIN:23 PM: I met with the patient to gather history and to perform my initial exam. We discussed plans for the ED course, including diagnostic testing and treatment.   7:52 PM I completed a pelvic exam on patient.   9:01 PM I rechecked and updated the patient with results.  9:07 PM [facility]   9:54 PM I spoke with OBGYN.     26 year old female,  diagnosed with missed miscarriage at ~12 weeks gestation ~2 weeks ago with history of obesity and depression, who presents for evaluation of vaginal bleeding. She reports onset of heavy bleeding ~8 hours ago and has needed to change her pad ~6 times / hour and has bled through her clothes twice. She reports associated menstrual-like cramping pain and some lightheadedness. No fevers.     On exam, abdomen soft with mild to moderate tenderness to palpation suprapubic region. On pelvic exam, there are products of conception visible at the cervical os; I am unable to remove products with gentle traction. Minimal vaginal bleeding.     IV access established and blood sent for labs.    Labs remarkable for leukocytosis  (WBC 12.6) with mild anemia (Hb 11.2).  No significant electrolyte derangements or renal impairment.  Beta-hCG is 116.  Blood type is A+, thus no RhoGAM is indicated.    Patient given IV morphine for pain to see if that would help her spontaneously complete the miscarriage.  After ~1 hour, she has not yet passed products of conception.      OB consulted and I spoke with Dr. Wei who recommended a second attempt to remove POC by rotating the POC. I again attempted to remove POC, but was unsuccessful.    I updated Dr. Wei and she presented to the ED and was able to remove POC. Patient then given Cytotec for bleeding. Dr. Wei recommends follow-up with Dr. Macdonald this week as previously scheduled and pelvic rest.  She does not think emergent pelvic ultrasound is indicated.    Patient was given Cytotec and discharged home with follow-up with Dr. Macdonadl, as previously scheduled.  Pelvic rest and return precautions provided.  Patient stable throughout ED course.      At the conclusion of the encounter I discussed the results of all the tests and the disposition. The questions were answered. The patient and family acknowledged understanding and were agreeable with the care plan.    MEDICATIONS GIVEN IN THE EMERGENCY DEPARTMENT:  Medications   morphine (PF) injection 4 mg (4 mg Intravenous Given 22)   misoprostol (CYTOTEC) tablet 800 mcg (800 mcg Oral Given 22)       NEW PRESCRIPTIONS STARTED AT TODAY'S ED VISIT:  Discharge Medication List as of 2022 11:12 PM          HPI     HPI     Abeba Toro is a 26 year old female,  diagnosed with missed miscarriage at ~12 weeks gestation with history of obesity and depression, who presents to this ED via walk-in for evaluation of vaginal bleeding.     Patient was diagnosed with a missed miscarriage by [facility] ~ 2 weeks ago. She had some bleeding after her ultrasound that diagnosed her and then some heavy vaginal bleeding on the  following Friday (~9 days ago). She thought that she had completed her miscarriage, however started having heavy bleeding today since ~11am (~8 hours ago). She states that she has needed to change her pad ~6 times / hour since the bleeding started and has bled through her clothes twice. She reports associated menstrual-like cramping pain and some lightheadedness. No fevers.     Patient follows with Mitzi Macdonald from [facility].     She has otherwise been in her usual state of health and denies chest pain, shortness of breath, N/V/D, cough or other concerns.     REVIEW OF SYSTEMS:  All other systems reviewed and are negative.      Medical History     No past medical history on file.    Past Surgical History:   Procedure Laterality Date     NO PAST SURGERIES         Family History   Problem Relation Age of Onset     Cholelithiasis Mother      Diabetes Father      Hypertension Father      Heart Disease Maternal Grandfather        Social History     Tobacco Use     Smoking status: Never Smoker     Smokeless tobacco: Never Used   Substance Use Topics     Alcohol use: Yes     Drug use: No       buPROPion (WELLBUTRIN XL) 300 MG 24 hr tablet  folic acid (FOLVITE) 1 MG tablet  sertraline (ZOLOFT) 50 MG tablet        Physical Exam     First Vitals:  Patient Vitals for the past 24 hrs:   BP Temp Temp src Pulse Resp SpO2 Weight   09/18/22 2300 102/57 -- -- 79 -- 98 % --   09/18/22 2245 111/54 -- -- 77 -- 98 % --   09/18/22 2100 103/56 -- -- 87 -- 98 % --   09/18/22 2030 112/56 -- -- 91 -- 97 % --   09/18/22 2015 119/56 -- -- 89 -- 97 % --   09/18/22 1905 133/73 98.1  F (36.7  C) Temporal 97 16 97 % 117.9 kg (260 lb)       PHYSICAL EXAM:   Physical Exam    GENERAL: Awake, alert.  In no acute distress.   HEENT: Normocephalic, atraumatic. Pupils equal, round and reactive. Conjunctiva normal.   NECK: No stridor.  PULMONARY: Symmetrical breath sounds without distress.  Lungs clear to auscultation bilaterally without wheezes, rhonchi or  rales.  CARDIO: Borderline tachycardic rate with regular rhythm.  No significant murmur, rub or gallop.    ABDOMINAL: Abdomen soft, non-distended with mild to moderate tenderness to palpation suprapubic region; no rebound tenderness or guarding.   PELVIC: There are products of conception visible at the cervical os; I am unable to remove products with gentle traction. Minimal vaginal bleeding.   EXTREMITIES: No lower extremity swelling or edema.      NEURO: Alert and oriented to person, place and time.  Cranial nerves grossly intact.  No focal motor deficit.  PSYCH: Normal mood and affect.  SKIN: No rashes.     Results     LAB:  All pertinent labs reviewed and interpreted  Labs Ordered and Resulted from Time of ED Arrival to Time of ED Departure   CBC WITH PLATELETS - Abnormal       Result Value    WBC Count 12.6 (*)     RBC Count 3.85      Hemoglobin 11.2 (*)     Hematocrit 34.5 (*)     MCV 90      MCH 29.1      MCHC 32.5      RDW 12.3      Platelet Count 311     HCG QUANTITATIVE PREGNANCY - Abnormal    hCG Quantitative 116 (*)    BASIC METABOLIC PANEL - Normal    Sodium 137      Potassium 4.0      Chloride 104      Carbon Dioxide (CO2) 26      Anion Gap 7      Urea Nitrogen 10      Creatinine 0.73      Calcium 9.0      Glucose 92      GFR Estimate >90     TYPE AND SCREEN, ADULT    ABO/RH(D) A POS      Antibody Screen Negative      SPECIMEN EXPIRATION DATE [id number]     SURGICAL PATHOLOGY EXAM   ABO/RH TYPE AND SCREEN         I, Mitzi Galarza, am serving as a scribe to document services personally performed by Alyce Caruso MD based on my observation and the provider's statements to me. I, Alyce Caruso MD attest that Mitzi Galarza is acting in a scribe capacity, has observed my performance of the services and has documented them in accordance with my direction.    Alyce Caruso MD  Emergency Medicine  [facility] EMERGENCY DEPARTMENT           Alyce Caruso,  MD  09/19/22 0952

## 2022-09-19 NOTE — PROGRESS NOTES
In House OB/GYN Note    I was called to Room 5 in the ED secondary to retained products at the cervical os for a patient with a missed .  The patient is a 27 yo  at 8 weeks gestation by ultrasound on 22 at [facility].  By dates she should have been about 12 weeks.  She had some heavy bleeding about a week ago, so thought she had completed the miscarriage.  Then today she started having heavy bleeding again, so she came into the ED.      Vitals:    22 1905 22 2030 22 2100   BP: 133/73 119/56 112/56 103/56   Pulse: 97 89 91 87   Resp: 16      Temp: 98.1  F (36.7  C)      TempSrc: Temporal      SpO2: 97% 97% 97% 98%   Weight: 117.9 kg (260 lb)        EG/BUS: no lesions  Vagina: no lesions, small amount of blood in the vault  Cervix: tissue at the os, minimal bleeding; tissue grasped with ring forceps and torsion used to remove the tissue.  There was some trailing membranous tissue that was removed with a Maria T clamp.  No bleeding after tissue was removed.    Recent Results (from the past 24 hour(s))   CBC (+ platelets, no diff)    Collection Time: 22  8:06 PM   Result Value Ref Range    WBC Count 12.6 (H) 4.0 - 11.0 10e3/uL    RBC Count 3.85 3.80 - 5.20 10e6/uL    Hemoglobin 11.2 (L) 11.7 - 15.7 g/dL    Hematocrit 34.5 (L) 35.0 - 47.0 %    MCV 90 78 - 100 fL    MCH 29.1 26.5 - 33.0 pg    MCHC 32.5 31.5 - 36.5 g/dL    RDW 12.3 10.0 - 15.0 %    Platelet Count 311 150 - 450 10e3/uL   Basic metabolic panel    Collection Time: 22  8:06 PM   Result Value Ref Range    Sodium 137 136 - 145 mmol/L    Potassium 4.0 3.5 - 5.0 mmol/L    Chloride 104 98 - 107 mmol/L    Carbon Dioxide (CO2) 26 22 - 31 mmol/L    Anion Gap 7 5 - 18 mmol/L    Urea Nitrogen 10 8 - 22 mg/dL    Creatinine 0.73 0.60 - 1.10 mg/dL    Calcium 9.0 8.5 - 10.5 mg/dL    Glucose 92 70 - 125 mg/dL    GFR Estimate >90 >60 mL/min/1.73m2   HCG quantitative pregnancy (blood)    Collection Time: 22   8:06 PM   Result Value Ref Range    hCG Quantitative 116 (H) 0 - 4 mlU/mL   Adult Type and Screen    Collection Time: 22  8:06 PM   Result Value Ref Range    ABO/RH(D) A POS     Antibody Screen Negative Negative    SPECIMEN EXPIRATION DATE [id number]    Extra Blue Top Tube    Collection Time: 22  8:06 PM   Result Value Ref Range    Hold Specimen JIC    Extra Red Top Tube    Collection Time: 22  8:06 PM   Result Value Ref Range    Hold Specimen JIC    Extra Green Top (Lithium Heparin) Tube    Collection Time: 22  8:06 PM   Result Value Ref Range    Hold Specimen JIC    Extra Purple Top Tube    Collection Time: 22  8:06 PM   Result Value Ref Range    Hold Specimen JIC        25 yo with missed , tissue removed.  Will give misoprostol before she leaves.  She has a follow up appointment with Dr Macdonald on  that she was encouraged to keep.  She was counseled on the possibility of some increased bleeding after the misoprostol.  Tissue was sent to Pathology.    Rosalina Wei MD

## 2022-09-19 NOTE — ED TRIAGE NOTES
Pt was told 2 weeks ago she is having a miscarriage, pt started started at 1100 today, bleeding and clots.  Feeling dizzy.   States going through 6 pads an hour.      Triage Assessment     Row Name 09/18/22 5747       Triage Assessment (Adult)    Airway WDL WDL       Respiratory WDL    Respiratory WDL WDL       Skin Circulation/Temperature WDL    Skin Circulation/Temperature WDL WDL       Cardiac WDL    Cardiac WDL WDL       Peripheral/Neurovascular WDL    Peripheral Neurovascular WDL WDL       Cognitive/Neuro/Behavioral WDL    Cognitive/Neuro/Behavioral WDL WDL

## 2022-09-20 LAB
PATH REPORT.COMMENTS IMP SPEC: NORMAL
PATH REPORT.COMMENTS IMP SPEC: NORMAL
PATH REPORT.FINAL DX SPEC: NORMAL
PATH REPORT.GROSS SPEC: NORMAL
PATH REPORT.MICROSCOPIC SPEC OTHER STN: NORMAL
PATH REPORT.RELEVANT HX SPEC: NORMAL
PHOTO IMAGE: NORMAL

## 2022-09-20 PROCEDURE — 88305 TISSUE EXAM BY PATHOLOGIST: CPT | Mod: 26 | Performed by: PATHOLOGY

## 2022-09-23 ENCOUNTER — TRANSFERRED RECORDS (OUTPATIENT)
Dept: HEALTH INFORMATION MANAGEMENT | Facility: CLINIC | Age: 27
End: 2022-09-23

## 2022-09-24 ENCOUNTER — HEALTH MAINTENANCE LETTER (OUTPATIENT)
Age: 27
End: 2022-09-24

## 2022-09-29 ENCOUNTER — PATIENT OUTREACH (OUTPATIENT)
Dept: CARE COORDINATION | Facility: CLINIC | Age: 27
End: 2022-09-29

## 2022-09-29 NOTE — PROGRESS NOTES
Contact   Chart Review     Situation: Patient chart reviewed by .    Background: pt has ultrasound that wasn't covered by insurance    Assessment:FRW has been working with pt and priovided info on how to resolve.     Plan/Recommendations:FRW to continue working with pt on getting bill resolved.     BAY Beatty, [facility]  Social Work Care Coordinator

## 2022-10-03 ENCOUNTER — PATIENT OUTREACH (OUTPATIENT)
Dept: CARE COORDINATION | Facility: CLINIC | Age: 27
End: 2022-10-03

## 2022-10-03 NOTE — PROGRESS NOTES
Clinic Care Coordination Contact  Program: Medical Bill   County: [facility] Case #:  [facility] Worker:   Carlos #:   Subscriber #:   Renewal:  Date Applied:     GÓMEZ Outreach:  10/3/2022: FRW called patient and she states she spoke with her provider regarding the referral for the ultrasound, however, her provider stated one was already sent.  Patient states she's no longer pregnant and at this point she might just pay the bill as it has turned itno a hassle. Patient has since gotten  and may be over income for [employer] but FRW e-mailed an application to [email or web address]. Patient will decide if she wants to try to apply on her own after figuring out their income. FRW will close the FRW Program.  9/12/2022: FRW called patient and advised her she will need to leave a message for her provider asking them to please place a referral for the ultrasound so it can be billed out. [facility] will not do a write off on a charge like this as the referral can be placed after and billed. FRW will follow up in 3 weeks.     Health Insurance:    [facility]     Referral/Screening:    [facility] Benefits   Is patient requesting help applying for [facility] benefits? No     Insurance:   [place] No   Is this an insurance renewal? No   Is this a new insurance application request? No     OTHER   Is this a anai care application? Yes   Any other information for the FRW? ultrasound coverage was denied by insuance because no referral was sent.  can you help with this?

## 2022-11-02 ENCOUNTER — PATIENT OUTREACH (OUTPATIENT)
Dept: CARE COORDINATION | Facility: CLINIC | Age: 27
End: 2022-11-02

## 2022-11-02 ASSESSMENT — ACTIVITIES OF DAILY LIVING (ADL): DEPENDENT_IADLS:: INDEPENDENT

## 2022-11-02 NOTE — PROGRESS NOTES
Clinic Care Coordination Contact  [facility]/Voicemail    Referral Source: PCP  Clinical Data: Care Coordinator Outreach  Outreach attempted x 1.  Left message on patient's voicemail with call back information and requested return call.    Plan: Care Coordinator will try to reach patient again in 10 business days.    Rupa Greene  [facility] Care Coordination  [facility]    Phone: [phone]

## 2022-11-07 ENCOUNTER — MYC MEDICAL ADVICE (OUTPATIENT)
Dept: FAMILY MEDICINE | Facility: CLINIC | Age: 27
End: 2022-11-07

## 2022-11-09 ENCOUNTER — PATIENT OUTREACH (OUTPATIENT)
Dept: CARE COORDINATION | Facility: CLINIC | Age: 27
End: 2022-11-09

## 2022-11-09 ASSESSMENT — ACTIVITIES OF DAILY LIVING (ADL): DEPENDENT_IADLS:: INDEPENDENT

## 2022-11-09 NOTE — PROGRESS NOTES
Clinic Care Coordination Contact  [facility]/Voicemail    Referral Source: PCP  Clinical Data: Care Coordinator Outreach  Outreach attempted x 2.  Left message on patient's voicemail with call back information and requested return call.    Plan: Routing to lead clinician CCSW for review.  CHW  will try to reach patient again in 1 month if no concerns.    Rupa Greene  [facility] Care Coordination  [facility]    Phone: [phone]

## 2022-11-15 ENCOUNTER — PATIENT OUTREACH (OUTPATIENT)
Dept: CARE COORDINATION | Facility: CLINIC | Age: 27
End: 2022-11-15

## 2022-11-15 NOTE — PROGRESS NOTES
Care Coordination Clinician Chart Review     Situation: Patient chart reviewed by   care coordinator.?     Background: Initial assessment and enrollment to Care Coordination was 9/14/22.?? Care plan(s) with patient-centered goal(s) were developed with participation from patient.? Lead CC handed patient off to CHW for continued outreach every 30 days.??     Assessment: Per chart review, patient outreach attempted by CC CHW on 11/9/22  Pt has not returned messages from CHW or FRW. .? Unsure if  Patient is actively working to accomplish goal(s).? Patient's goal(s) remain(s) appropriate at this time.? Patient is not due for updated Plan of Care.? Annual assessment will be due 9/14/23 .    Will ask CHW to reach out one more time.     Care Plan: connect with a therapist and Pregnancy loss support     Problem: Connect with Mental Health Supports     Priority: High    Goal: connect with a therapist and Pregnancy loss support     Start Date: 9/8/2022 Expected End Date: 10/13/2022    Note:     Barriers: awareness or resources  Strengths: willing to see out support  Patient expressed understanding of goal: yes  Action steps to achieve this goal:  1. I will call [facility] to schedule a therapy appt  2. I will contact [facility] for pregnancy support group  3. I will call  and provider with any questions.    9/8/22 in process of miscarrying                  Problem: HP GENERAL PROBLEM             Care Plan: Community Resources/ food programs     Problem: need for help with food occasionally     Onset Date: 9/8/2022    Goal: Identify Options for Meal Assistance                 Problem: Reliable food source     Goal: Establish Options for Affordable Food Sources     Note:     Barriers: timing of pay checks  Strengths: willing to seek out resources  Patient expressed understanding of goal: yes  Action steps to achieve this goal:  1. I will research Fare For All  locations and  dates.  2. I will use a food shelf if needed.  3.  I will call SW or CHW with any questions.                  Problem: Insufficient In-home support                 Plan/Recommendations: The patient will continue working with Care Coordination to achieve above goal(s).? CHW will involve Lead CC as needed or if patient is ready to move to maintenance.? Lead CC will continue to monitor CHW s monthly outreaches and progress to goal(s) every 6 weeks.    Plan of Care updated and sent to patient: No  Will ask CHW to reach out one more time      FRANCHESKA Deleon / JANAE Sadler  [facility] Primary Care   Care Coordination  [facility]  11/15/2022 10:17 AM

## 2022-11-30 ENCOUNTER — OFFICE VISIT (OUTPATIENT)
Dept: FAMILY MEDICINE | Facility: CLINIC | Age: 27
End: 2022-11-30
Payer: COMMERCIAL

## 2022-11-30 VITALS
HEIGHT: 68 IN | DIASTOLIC BLOOD PRESSURE: 70 MMHG | OXYGEN SATURATION: 98 % | WEIGHT: 277 LBS | BODY MASS INDEX: 41.98 KG/M2 | HEART RATE: 93 BPM | TEMPERATURE: 98.4 F | SYSTOLIC BLOOD PRESSURE: 120 MMHG | RESPIRATION RATE: 18 BRPM

## 2022-11-30 DIAGNOSIS — F33.1 MODERATE EPISODE OF RECURRENT MAJOR DEPRESSIVE DISORDER (H): ICD-10-CM

## 2022-11-30 DIAGNOSIS — N92.6 IRREGULAR PERIODS: ICD-10-CM

## 2022-11-30 DIAGNOSIS — O03.9 MISCARRIAGE: Primary | ICD-10-CM

## 2022-11-30 DIAGNOSIS — Z23 NEED FOR VACCINATION: ICD-10-CM

## 2022-11-30 DIAGNOSIS — R09.82 POSTNASAL DRIP: ICD-10-CM

## 2022-11-30 LAB
HBA1C MFR BLD: 5.2 % (ref 0–5.6)
HCG INTACT+B SERPL-ACNC: <1 MIU/ML
TSH SERPL DL<=0.005 MIU/L-ACNC: 3.04 UIU/ML (ref 0.3–4.2)

## 2022-11-30 PROCEDURE — 90471 IMMUNIZATION ADMIN: CPT | Performed by: FAMILY MEDICINE

## 2022-11-30 PROCEDURE — 0124A COVID-19 VACCINE BIVALENT BOOSTER 12+ (PFIZER): CPT | Performed by: FAMILY MEDICINE

## 2022-11-30 PROCEDURE — 99214 OFFICE O/P EST MOD 30 MIN: CPT | Mod: 25 | Performed by: FAMILY MEDICINE

## 2022-11-30 PROCEDURE — 36415 COLL VENOUS BLD VENIPUNCTURE: CPT | Performed by: FAMILY MEDICINE

## 2022-11-30 PROCEDURE — 91312 COVID-19 VACCINE BIVALENT BOOSTER 12+ (PFIZER): CPT | Performed by: FAMILY MEDICINE

## 2022-11-30 PROCEDURE — 84443 ASSAY THYROID STIM HORMONE: CPT | Performed by: FAMILY MEDICINE

## 2022-11-30 PROCEDURE — 83036 HEMOGLOBIN GLYCOSYLATED A1C: CPT | Performed by: FAMILY MEDICINE

## 2022-11-30 PROCEDURE — 90686 IIV4 VACC NO PRSV 0.5 ML IM: CPT | Performed by: FAMILY MEDICINE

## 2022-11-30 PROCEDURE — 84702 CHORIONIC GONADOTROPIN TEST: CPT | Performed by: FAMILY MEDICINE

## 2022-11-30 RX ORDER — SERTRALINE HYDROCHLORIDE 100 MG/1
100 TABLET, FILM COATED ORAL DAILY
Qty: 90 TABLET | Refills: 3 | Status: SHIPPED | OUTPATIENT
Start: 2022-11-30 | End: 2023-11-27

## 2022-11-30 RX ORDER — FLUTICASONE PROPIONATE 50 MCG
1 SPRAY, SUSPENSION (ML) NASAL DAILY
Qty: 16 G | Refills: 1 | Status: SHIPPED | OUTPATIENT
Start: 2022-11-30

## 2022-11-30 ASSESSMENT — PATIENT HEALTH QUESTIONNAIRE - PHQ9
10. IF YOU CHECKED OFF ANY PROBLEMS, HOW DIFFICULT HAVE THESE PROBLEMS MADE IT FOR YOU TO DO YOUR WORK, TAKE CARE OF THINGS AT HOME, OR GET ALONG WITH OTHER PEOPLE: EXTREMELY DIFFICULT
SUM OF ALL RESPONSES TO PHQ QUESTIONS 1-9: 10
SUM OF ALL RESPONSES TO PHQ QUESTIONS 1-9: 10

## 2022-11-30 ASSESSMENT — ENCOUNTER SYMPTOMS: COUGH: 1

## 2022-11-30 NOTE — PATIENT INSTRUCTIONS
Ovulation Predictor Kit    Start urinating on a stick in the morning at the end of your period, approximately Day 7.     Take one of the tests every morning until it turns positive (hopefully).      You can time intercourse based on when the test turns positive.     If it never turns positive, this may mean that you didn't ovulate that month, which may happen as your cycle becomes more regular again.     For your COLD:     Take 25 mg benadryl at bedtime for a month or until your symptoms get better.  Take Flonase twice daily.     Increase sertraline to 100 mg/day.   Make a therapy appointment and let me know if you want a recommendation.

## 2022-11-30 NOTE — PROGRESS NOTES
"  Assessment & Plan     Miscarriage: due to irregular ongoing bleeding, will make sure HCG quant is negative.   - HCG quantitative pregnancy    Need for vaccination  - INFLUENZA VACCINE IM > 6 MONTHS VALENT IIV4 (AFLURIA/FLUZONE)  - COVID-19,PF,PFIZER BOOSTER BIVALENT (12+YRS)    Irregular periods: she is interested in getting pregnant again so we discussed using ovulation predictor kits.  We will check the following because her period has been somewhat irregular.  Discussed that it can sometimes take up to a year to get pregnant.  If she is not getting pregnant within the next 6 months however, would consider referral to a specialist.  - HCG quantitative pregnancy  - TSH with free T4 reflex  - Hemoglobin A1c    Postnasal drip: recommended flonase and nightly benadryl for a month  - fluticasone (FLONASE) 50 MCG/ACT nasal spray  Dispense: 16 g; Refill: 1    Moderate episode of recurrent major depressive disorder (H): will increase sertraline to 100 mg/day.  Despite having had side effects in the past, she does not want to increase it at this time.  She does have some names of therapists and she is planning to make an appointment.  I encouraged her to reach out if she needs help finding someone. She has a supportive  and is not having active suicidal ideation at this time. Follow up with me in 3 months, sooner if needed.   - sertraline (ZOLOFT) 100 MG tablet  Dispense: 90 tablet; Refill: 3         BMI:   Estimated body mass index is 42.42 kg/m  as calculated from the following:    Height as of this encounter: 1.721 m (5' 7.76\").    Weight as of this encounter: 125.6 kg (277 lb).       Depression Screening Follow Up    PHQ 11/30/2022   PHQ-9 Total Score 10   Q9: Thoughts of better off dead/self-harm past 2 weeks Several days   F/U: Thoughts of suicide or self-harm No   F/U: Safety concerns No         Follow Up      No follow-ups on file.    Bre Alberto MD  [facility]    Subjective " "  Abeba is a 27 year old, presenting for the following health issues:  possible spontaneous AB (/) and Cough (For 2 mos )      Cough    History of Present Illness       Reason for visit:  Period / Pregnancy + Cough    She eats 0-1 servings of fruits and vegetables daily.She consumes 1 sweetened beverage(s) daily.She exercises with enough effort to increase her heart rate 10 to 19 minutes per day.  She exercises with enough effort to increase her heart rate 5 days per week. She is missing 1 dose(s) of medications per week.  She is not taking prescribed medications regularly due to remembering to take.    Today's PHQ-9         PHQ-9 Total Score: 10    PHQ-9 Q9 Thoughts of better off dead/self-harm past 2 weeks :   Several days  Thoughts of suicide or self harm: (P) No  Self-harm Plan:     Self-harm Action:       Safety concerns for self or others: (P) No    How difficult have these problems made it for you to do your work, take care of things at home, or get along with other people: Extremely difficult     León- . Pregnancy is going well!     Had miscarriage beginning of September but bled for a month. Stopped bleeding on 10/6. Then, next period was October 15th-26th, pretty light.     Started bleeding again on 11/24, heavier since yesterday, 11/29.     Doesn't really know how regular her periods are because just got off BC. She does want to try to conceive again.     The other issue is she has had a bad cough for about a month. Had a cold and still has lingering mucous in her throat .    Review of Systems   Respiratory: Positive for cough.          Objective    /70   Pulse 93   Temp 98.4  F (36.9  C) (Oral)   Resp 18   Ht 1.721 m (5' 7.76\")   Wt 125.6 kg (277 lb)   LMP 11/24/2022 (Exact Date)   SpO2 98%   Breastfeeding Unknown   BMI 42.42 kg/m    Body mass index is 42.42 kg/m .  Physical Exam   GENERAL: healthy, alert and no distress  PSYCH: mentation appears normal, affect normal/bright      "

## 2022-12-09 ENCOUNTER — PATIENT OUTREACH (OUTPATIENT)
Dept: CARE COORDINATION | Facility: CLINIC | Age: 27
End: 2022-12-09

## 2022-12-09 ASSESSMENT — ACTIVITIES OF DAILY LIVING (ADL): DEPENDENT_IADLS:: INDEPENDENT

## 2022-12-09 NOTE — PROGRESS NOTES
Clinic Care Coordination Contact  [facility]/Voicemail    Referral Source: PCP  Clinical Data: Care Coordinator Outreach  Outreach attempted x 1.  Left message on patient's voicemail with call back information and requested return call.    Plan: Care Coordinator will try to reach patient again in 10 business days.    Rupa Greene  [facility] Care Coordination  [facility]    Phone: [phone]

## 2022-12-19 ENCOUNTER — PATIENT OUTREACH (OUTPATIENT)
Dept: CARE COORDINATION | Facility: CLINIC | Age: 27
End: 2022-12-19

## 2022-12-19 ASSESSMENT — ACTIVITIES OF DAILY LIVING (ADL): DEPENDENT_IADLS:: INDEPENDENT

## 2022-12-19 NOTE — PROGRESS NOTES
Clinic Care Coordination Contact    Community Health Worker Follow Up    Care Gaps:     There are no preventive care reminders to display for this patient.    Currently there are no Care Gaps.    Care Plan:   Care Plan: connect with a therapist and Pregnancy loss support Completed 12/19/2022    Problem: Connect with Mental Health Supports  Resolved 12/19/2022    Priority: High    Goal: connect with a therapist and Pregnancy loss support  Completed 12/19/2022    Start Date: 9/8/2022 Expected End Date: 10/13/2022    Note:     Barriers: awareness or resources  Strengths: willing to see out support  Patient expressed understanding of goal: yes  Action steps to achieve this goal:  1. I will call [facility] to schedule a therapy appt  2. I will contact [facility] for pregnancy support group  3. I will call SW and provider with any questions.    9/8/22 in process of miscarrying                    Care Plan: Community Resources/ food programs Completed 12/19/2022    Problem: need for help with food occasionally  Resolved 12/19/2022    Onset Date: 9/8/2022    Goal: Identify Options for Meal Assistance  Completed 12/19/2022                Problem: Reliable food source  Resolved 12/19/2022    Goal: Establish Options for Affordable Food Sources  Completed 12/19/2022    This Visit's Progress: 100%    Note:     Barriers: timing of pay checks  Strengths: willing to seek out resources  Patient expressed understanding of goal: yes  Action steps to achieve this goal:  1. I will research Fare For All  locations and  dates.  2. I will use a food shelf if needed.  3. I will call SW or CHW with any questions.                  Problem: Insufficient In-home support  Resolved 12/19/2022            Intervention and Education during outreach:     Patient shares that she did reach out to [facility] and she's okay now. She did not qualify for [employer] For All. She does have the list of food shelf that CCSW sent her in case she needs to utilize  resource.     Patient feels that she no longer needs the support of CCC and understands that she can re-enroll if anything changes    CHW Plan: Routing to lead clinician CCSW for review humble mosleyuation.     Rupa [facility] Care Coordination  [facility]    Phone: [phone]

## 2022-12-21 ENCOUNTER — PATIENT OUTREACH (OUTPATIENT)
Dept: CARE COORDINATION | Facility: CLINIC | Age: 27
End: 2022-12-21

## 2022-12-21 NOTE — PROGRESS NOTES
Contact   Chart Review     Situation: Patient chart reviewed by .    Background: pt enrolled in [facility] 8/2022 for assistance with mental health supports as well as food resources    Assessment:pt has completed goals      Plan/Recommendations: CCSW graduated pt and sent letter to pt. [address].         BAY Beatty, [facility]  Social Work Care Coordinator

## 2022-12-21 NOTE — LETTER
[facility] CARE COORDINATION  [facility]   December 21, 2022    Abeba Toro  [address]  [place] [address]    Dear Abeba,  Your Care Team congratulates you on your journey to maintain wellness. This document will help guide you on your journey to maintain a healthy lifestyle.  You can use this to help you overcome any barriers you may encounter.  If you should have any questions or concerns, you can contact the members of your Care Team or contact your Primary Care Clinic for assistance.     Health Maintenance  Health Maintenance Reviewed: Up to date    My Access Plan  Medical Emergency 911   Primary Clinic Line System Virtual Login -     24 Hour Appointment Line [phone] or  [phone] ([phone]) (toll-free)   24 Hour Nurse Line [phone] (toll-free)   Preferred Urgent Care     Preferred Hospital     Preferred Pharmacy [facility] - [place], [place] - [address] Behavioral Health Crisis Line The National Suicide Prevention Lifeline at 1-840.650.4953 or 911     My Care Team Members  Patient Care Team       Relationship Specialty Notifications Start End    Bre Alberto MD PCP - General Family Medicine  9/18/22     Phone: [phone] Fax: [phone]         [address] [place] [address]    Bre Alberto MD Assigned PCP   7/23/22     Phone: [phone] Fax: [phone]         [address] [place] [address]    Maria T Garcia LGSW Lead Care Coordinator  Admissions 8/29/22 12/21/22    Rupa Greene Community Health Worker Primary Care - CC Admissions 8/29/22 12/21/22               Goals    None          Advance Care Plans/Directives Type:          It has been your Clinic Care Team's pleasure to work with you on your goals.    Regards,  Your Clinic Care Team

## 2022-12-23 DIAGNOSIS — R09.82 POSTNASAL DRIP: ICD-10-CM

## 2022-12-23 RX ORDER — FLUTICASONE PROPIONATE 50 MCG
SPRAY, SUSPENSION (ML) NASAL
Qty: 16 ML | Refills: 1 | OUTPATIENT
Start: 2022-12-23

## 2022-12-23 NOTE — TELEPHONE ENCOUNTER
Last Written Prescription Date:  11/30/22  Last Fill Quantity: 16,  # refills: 1   Last office visit provider:  11/30/22     Kendra Raza RN 12/23/22 1:17 PM

## 2023-02-25 ASSESSMENT — PATIENT HEALTH QUESTIONNAIRE - PHQ9
10. IF YOU CHECKED OFF ANY PROBLEMS, HOW DIFFICULT HAVE THESE PROBLEMS MADE IT FOR YOU TO DO YOUR WORK, TAKE CARE OF THINGS AT HOME, OR GET ALONG WITH OTHER PEOPLE: VERY DIFFICULT
SUM OF ALL RESPONSES TO PHQ QUESTIONS 1-9: 8
SUM OF ALL RESPONSES TO PHQ QUESTIONS 1-9: 8

## 2023-02-25 ASSESSMENT — ANXIETY QUESTIONNAIRES
GAD7 TOTAL SCORE: 6
5. BEING SO RESTLESS THAT IT IS HARD TO SIT STILL: NOT AT ALL
8. IF YOU CHECKED OFF ANY PROBLEMS, HOW DIFFICULT HAVE THESE MADE IT FOR YOU TO DO YOUR WORK, TAKE CARE OF THINGS AT HOME, OR GET ALONG WITH OTHER PEOPLE?: SOMEWHAT DIFFICULT
3. WORRYING TOO MUCH ABOUT DIFFERENT THINGS: SEVERAL DAYS
7. FEELING AFRAID AS IF SOMETHING AWFUL MIGHT HAPPEN: NOT AT ALL
7. FEELING AFRAID AS IF SOMETHING AWFUL MIGHT HAPPEN: NOT AT ALL
1. FEELING NERVOUS, ANXIOUS, OR ON EDGE: SEVERAL DAYS
2. NOT BEING ABLE TO STOP OR CONTROL WORRYING: SEVERAL DAYS
GAD7 TOTAL SCORE: 6
GAD7 TOTAL SCORE: 6
6. BECOMING EASILY ANNOYED OR IRRITABLE: MORE THAN HALF THE DAYS
IF YOU CHECKED OFF ANY PROBLEMS ON THIS QUESTIONNAIRE, HOW DIFFICULT HAVE THESE PROBLEMS MADE IT FOR YOU TO DO YOUR WORK, TAKE CARE OF THINGS AT HOME, OR GET ALONG WITH OTHER PEOPLE: SOMEWHAT DIFFICULT
4. TROUBLE RELAXING: SEVERAL DAYS

## 2023-03-03 ENCOUNTER — OFFICE VISIT (OUTPATIENT)
Dept: FAMILY MEDICINE | Facility: CLINIC | Age: 28
End: 2023-03-03
Payer: COMMERCIAL

## 2023-03-03 VITALS
WEIGHT: 264.5 LBS | HEART RATE: 88 BPM | SYSTOLIC BLOOD PRESSURE: 116 MMHG | TEMPERATURE: 98.1 F | DIASTOLIC BLOOD PRESSURE: 74 MMHG | BODY MASS INDEX: 40.09 KG/M2 | RESPIRATION RATE: 18 BRPM | HEIGHT: 68 IN

## 2023-03-03 DIAGNOSIS — F33.1 MODERATE EPISODE OF RECURRENT MAJOR DEPRESSIVE DISORDER (H): Primary | ICD-10-CM

## 2023-03-03 DIAGNOSIS — Z30.011 ENCOUNTER FOR INITIAL PRESCRIPTION OF CONTRACEPTIVE PILLS: ICD-10-CM

## 2023-03-03 PROCEDURE — 99214 OFFICE O/P EST MOD 30 MIN: CPT | Performed by: FAMILY MEDICINE

## 2023-03-03 RX ORDER — LEVONORGESTREL/ETHIN.ESTRADIOL 0.1-0.02MG
1 TABLET ORAL DAILY
Qty: 84 TABLET | Refills: 3 | Status: SHIPPED | OUTPATIENT
Start: 2023-03-03 | End: 2024-04-29

## 2023-03-03 ASSESSMENT — PATIENT HEALTH QUESTIONNAIRE - PHQ9
SUM OF ALL RESPONSES TO PHQ QUESTIONS 1-9: 8
10. IF YOU CHECKED OFF ANY PROBLEMS, HOW DIFFICULT HAVE THESE PROBLEMS MADE IT FOR YOU TO DO YOUR WORK, TAKE CARE OF THINGS AT HOME, OR GET ALONG WITH OTHER PEOPLE: VERY DIFFICULT

## 2023-03-03 ASSESSMENT — ANXIETY QUESTIONNAIRES: GAD7 TOTAL SCORE: 6

## 2023-03-03 NOTE — PROGRESS NOTES
"  Assessment & Plan     Moderate episode of recurrent major depressive disorder (H): doing much better right now.  Continue meds as prescribed as well as self-help exercises. Wished her well on her new start in [place].     Encounter for initial prescription of contraceptive pills  - levonorgestrel-ethinyl estradiol (AVIANE) 0.1-20 MG-MCG tablet  Dispense: 84 tablet; Refill: 3         BMI:   Estimated body mass index is 40.51 kg/m  as calculated from the following:    Height as of this encounter: 1.721 m (5' 7.75\").    Weight as of this encounter: 120 kg (264 lb 8 oz).       No follow-ups on file.    Bre Alberto MD  [facility] Us is a 27 year old, presenting for the following health issues:  Depression      History of Present Illness       Mental Health Follow-up:  Patient presents to follow-up on Depression & Anxiety.Patient's depression since last visit has been:  Better  The patient is not having other symptoms associated with depression.  Patient's anxiety since last visit has been:  Medium  The patient is not having other symptoms associated with anxiety.  Any significant life events: No  Patient is feeling anxious or having panic attacks.  Patient has no concerns about alcohol or drug use.    She eats 0-1 servings of fruits and vegetables daily.She consumes 1 sweetened beverage(s) daily.She exercises with enough effort to increase her heart rate 10 to 19 minutes per day.  She exercises with enough effort to increase her heart rate 4 days per week. She is missing 1 dose(s) of medications per week.  She is not taking prescribed medications regularly due to remembering to take.    Today's PHQ-9         PHQ-9 Total Score: 8    PHQ-9 Q9 Thoughts of better off dead/self-harm past 2 weeks :   Not at all    How difficult have these problems made it for you to do your work, take care of things at home, or get along with other people: Very difficult  Today's WARREN-7 " "Score: 6     Dog. Maria rizo.     Doing a course on feelings and depression, grounding exercises. Not formal therapy, but shes' found it to be very helpful. Would like to keep meds the same.     Working at a charter school as an  but planning to get a new job in [employer]. Additionally, she and her  are planning to move to [place] within the year for a new start. They both work in [employer] and that area has more opportunity. She would like a prescription for birth control pills because she does not want to become pregnant.     Objective    /74   Pulse 88   Temp 98.1  F (36.7  C) (Oral)   Resp 18   Ht 1.721 m (5' 7.75\")   Wt 120 kg (264 lb 8 oz)   LMP 02/27/2023 (Approximate)   Breastfeeding Unknown   BMI 40.51 kg/m    Body mass index is 40.51 kg/m .  Physical Exam   GENERAL: healthy, alert and no distress  MS: no gross musculoskeletal defects noted, no edema  SKIN: no suspicious lesions or rashes  NEURO: Normal strength and tone, mentation intact and speech normal  PSYCH: mentation appears normal, affect normal/bright              " [Follow-up Visit ___] : a follow-up visit  for [unfilled]

## 2023-04-26 ENCOUNTER — PATIENT OUTREACH (OUTPATIENT)
Dept: CARE COORDINATION | Facility: CLINIC | Age: 28
End: 2023-04-26
Payer: COMMERCIAL

## 2023-05-10 ENCOUNTER — PATIENT OUTREACH (OUTPATIENT)
Dept: CARE COORDINATION | Facility: CLINIC | Age: 28
End: 2023-05-10
Payer: COMMERCIAL

## 2023-08-05 ENCOUNTER — HEALTH MAINTENANCE LETTER (OUTPATIENT)
Age: 28
End: 2023-08-05

## 2023-09-11 ENCOUNTER — MYC MEDICAL ADVICE (OUTPATIENT)
Dept: FAMILY MEDICINE | Facility: CLINIC | Age: 28
End: 2023-09-11
Payer: COMMERCIAL

## 2023-09-11 DIAGNOSIS — F33.1 MODERATE EPISODE OF RECURRENT MAJOR DEPRESSIVE DISORDER (H): ICD-10-CM

## 2023-09-11 DIAGNOSIS — Z76.0 ENCOUNTER FOR MEDICATION REFILL: Primary | ICD-10-CM

## 2023-09-11 RX ORDER — BUPROPION HYDROCHLORIDE 300 MG/1
300 TABLET ORAL DAILY
Qty: 90 TABLET | Refills: 3 | Status: SHIPPED | OUTPATIENT
Start: 2023-09-11

## 2023-09-27 ENCOUNTER — MYC MEDICAL ADVICE (OUTPATIENT)
Dept: FAMILY MEDICINE | Facility: CLINIC | Age: 28
End: 2023-09-27
Payer: COMMERCIAL

## 2023-10-02 ENCOUNTER — TELEPHONE (OUTPATIENT)
Dept: FAMILY MEDICINE | Facility: CLINIC | Age: 28
End: 2023-10-02
Payer: COMMERCIAL

## 2023-10-02 NOTE — LETTER
October 12, 2023      Abeba Toro  [address] [place] [address]        Dear Dr. Bre Us would like you to schedule an appointment to discuss mental health and emotional support animals.     If you are still interested in exploring emotional support animals, schedule an appointment with your doctor.       Sincerely,        Bre Alberto MD

## 2023-10-02 NOTE — TELEPHONE ENCOUNTER
Please call Abeba and explain that I would like her to do a visit in order to discuss her mental health and her emotional support animals since we need to have documentation in her chart that we have discussed this.     Ok to use an approval requested spot. This visit can be VIRTUAL, either video or phone, as well.     Dr. Alberto.

## 2023-11-26 DIAGNOSIS — F33.1 MODERATE EPISODE OF RECURRENT MAJOR DEPRESSIVE DISORDER (H): ICD-10-CM

## 2023-11-27 RX ORDER — SERTRALINE HYDROCHLORIDE 100 MG/1
100 TABLET, FILM COATED ORAL DAILY
Qty: 90 TABLET | Refills: 3 | Status: SHIPPED | OUTPATIENT
Start: 2023-11-27

## 2024-03-22 SDOH — HEALTH STABILITY: PHYSICAL HEALTH: ON AVERAGE, HOW MANY MINUTES DO YOU ENGAGE IN EXERCISE AT THIS LEVEL?: 40 MIN

## 2024-03-22 SDOH — HEALTH STABILITY: PHYSICAL HEALTH: ON AVERAGE, HOW MANY DAYS PER WEEK DO YOU ENGAGE IN MODERATE TO STRENUOUS EXERCISE (LIKE A BRISK WALK)?: 5 DAYS

## 2024-03-22 ASSESSMENT — SOCIAL DETERMINANTS OF HEALTH (SDOH): HOW OFTEN DO YOU GET TOGETHER WITH FRIENDS OR RELATIVES?: ONCE A WEEK

## 2024-03-27 ENCOUNTER — OFFICE VISIT (OUTPATIENT)
Dept: FAMILY MEDICINE | Facility: CLINIC | Age: 29
End: 2024-03-27
Payer: COMMERCIAL

## 2024-03-27 VITALS
SYSTOLIC BLOOD PRESSURE: 130 MMHG | WEIGHT: 247 LBS | HEIGHT: 68 IN | OXYGEN SATURATION: 97 % | TEMPERATURE: 98.1 F | RESPIRATION RATE: 16 BRPM | DIASTOLIC BLOOD PRESSURE: 79 MMHG | HEART RATE: 76 BPM | BODY MASS INDEX: 37.44 KG/M2

## 2024-03-27 DIAGNOSIS — Z30.011 ENCOUNTER FOR INITIAL PRESCRIPTION OF CONTRACEPTIVE PILLS: ICD-10-CM

## 2024-03-27 DIAGNOSIS — Z00.00 ANNUAL PHYSICAL EXAM: Primary | ICD-10-CM

## 2024-03-27 DIAGNOSIS — F33.1 MODERATE EPISODE OF RECURRENT MAJOR DEPRESSIVE DISORDER (H): ICD-10-CM

## 2024-03-27 DIAGNOSIS — Z83.3 FAMILY HISTORY OF DIABETES MELLITUS: ICD-10-CM

## 2024-03-27 LAB
ALBUMIN SERPL BCG-MCNC: 4 G/DL (ref 3.5–5.2)
ALP SERPL-CCNC: 86 U/L (ref 40–150)
ALT SERPL W P-5'-P-CCNC: 12 U/L (ref 0–50)
ANION GAP SERPL CALCULATED.3IONS-SCNC: 11 MMOL/L (ref 7–15)
AST SERPL W P-5'-P-CCNC: 16 U/L (ref 0–45)
BASOPHILS # BLD AUTO: 0 10E3/UL (ref 0–0.2)
BASOPHILS NFR BLD AUTO: 1 %
BILIRUB SERPL-MCNC: <0.2 MG/DL
BUN SERPL-MCNC: 12.6 MG/DL (ref 6–20)
CALCIUM SERPL-MCNC: 9.2 MG/DL (ref 8.6–10)
CHLORIDE SERPL-SCNC: 103 MMOL/L (ref 98–107)
CREAT SERPL-MCNC: 0.87 MG/DL (ref 0.51–0.95)
DEPRECATED HCO3 PLAS-SCNC: 24 MMOL/L (ref 22–29)
EGFRCR SERPLBLD CKD-EPI 2021: >90 ML/MIN/1.73M2
EOSINOPHIL # BLD AUTO: 0.1 10E3/UL (ref 0–0.7)
EOSINOPHIL NFR BLD AUTO: 1 %
ERYTHROCYTE [DISTWIDTH] IN BLOOD BY AUTOMATED COUNT: 12.5 % (ref 10–15)
GLUCOSE SERPL-MCNC: 98 MG/DL (ref 70–99)
HBA1C MFR BLD: 5.3 % (ref 0–5.6)
HCT VFR BLD AUTO: 42.1 % (ref 35–47)
HGB BLD-MCNC: 13.5 G/DL (ref 11.7–15.7)
IMM GRANULOCYTES # BLD: 0 10E3/UL
IMM GRANULOCYTES NFR BLD: 0 %
LYMPHOCYTES # BLD AUTO: 2.4 10E3/UL (ref 0.8–5.3)
LYMPHOCYTES NFR BLD AUTO: 31 %
MCH RBC QN AUTO: 28.4 PG (ref 26.5–33)
MCHC RBC AUTO-ENTMCNC: 32.1 G/DL (ref 31.5–36.5)
MCV RBC AUTO: 88 FL (ref 78–100)
MONOCYTES # BLD AUTO: 0.5 10E3/UL (ref 0–1.3)
MONOCYTES NFR BLD AUTO: 6 %
NEUTROPHILS # BLD AUTO: 4.8 10E3/UL (ref 1.6–8.3)
NEUTROPHILS NFR BLD AUTO: 62 %
PLATELET # BLD AUTO: 317 10E3/UL (ref 150–450)
POTASSIUM SERPL-SCNC: 4.1 MMOL/L (ref 3.4–5.3)
PROT SERPL-MCNC: 7.5 G/DL (ref 6.4–8.3)
RBC # BLD AUTO: 4.76 10E6/UL (ref 3.8–5.2)
SODIUM SERPL-SCNC: 138 MMOL/L (ref 135–145)
TSH SERPL DL<=0.005 MIU/L-ACNC: 1.99 UIU/ML (ref 0.3–4.2)
WBC # BLD AUTO: 7.8 10E3/UL (ref 4–11)

## 2024-03-27 PROCEDURE — 84443 ASSAY THYROID STIM HORMONE: CPT | Performed by: PHYSICIAN ASSISTANT

## 2024-03-27 PROCEDURE — 99213 OFFICE O/P EST LOW 20 MIN: CPT | Mod: 25 | Performed by: PHYSICIAN ASSISTANT

## 2024-03-27 PROCEDURE — 83036 HEMOGLOBIN GLYCOSYLATED A1C: CPT | Performed by: PHYSICIAN ASSISTANT

## 2024-03-27 PROCEDURE — 99395 PREV VISIT EST AGE 18-39: CPT | Performed by: PHYSICIAN ASSISTANT

## 2024-03-27 PROCEDURE — 36415 COLL VENOUS BLD VENIPUNCTURE: CPT | Performed by: PHYSICIAN ASSISTANT

## 2024-03-27 PROCEDURE — 80053 COMPREHEN METABOLIC PANEL: CPT | Performed by: PHYSICIAN ASSISTANT

## 2024-03-27 PROCEDURE — 85025 COMPLETE CBC W/AUTO DIFF WBC: CPT | Performed by: PHYSICIAN ASSISTANT

## 2024-03-27 ASSESSMENT — PAIN SCALES - GENERAL: PAINLEVEL: NO PAIN (0)

## 2024-03-27 ASSESSMENT — PATIENT HEALTH QUESTIONNAIRE - PHQ9
SUM OF ALL RESPONSES TO PHQ QUESTIONS 1-9: 5
10. IF YOU CHECKED OFF ANY PROBLEMS, HOW DIFFICULT HAVE THESE PROBLEMS MADE IT FOR YOU TO DO YOUR WORK, TAKE CARE OF THINGS AT HOME, OR GET ALONG WITH OTHER PEOPLE: SOMEWHAT DIFFICULT
SUM OF ALL RESPONSES TO PHQ QUESTIONS 1-9: 5

## 2024-03-27 NOTE — PROGRESS NOTES
"Preventive Care Visit  [facility] QUIANA Chavez PA-C, Physician Assistant - Medical  Mar 27, 2024      Assessment & Plan     Annual physical exam  Annual labs ordered. Healthy diet and exercise reviewed. Recommended routine dental, eye, and skin screenings.  Declines immunizations today.    - CBC with platelets and differential  - Comprehensive metabolic panel (BMP + Alb, Alk Phos, ALT, AST, Total. Bili, TP)  - TSH with free T4 reflex  - Hemoglobin A1c    Moderate episode of recurrent major depressive disorder (H)  Labs today.  Continue Zoloft/Wellbutrin.  Refill when due.  Follow-up in 1 year.  Will provide JATIN letter via [employer].  - CBC with platelets and differential  - Comprehensive metabolic panel (BMP + Alb, Alk Phos, ALT, AST, Total. Bili, TP)  - TSH with free T4 reflex    Encounter for initial prescription of contraceptive pills  Side effects expectations reviewed.  Okay to refill when due.    Family history of diabetes mellitus  Check A1c today given family history of diabetes.  Believes that is type II.  - Hemoglobin A1c      Patient has been advised of split billing requirements and indicates understanding: Yes    30 minutes spent by me on the date of the encounter doing chart review, review of test results, interpretation of tests, patient visit, and documentation       BMI  Estimated body mass index is 37.83 kg/m  as calculated from the following:    Height as of this encounter: 1.721 m (5' 7.76\").    Weight as of this encounter: 112 kg (247 lb).   Weight management plan: Discussed healthy diet and exercise guidelines    Counseling  Appropriate preventive services were discussed with this patient, including applicable screening as appropriate for fall prevention, nutrition, physical activity, Tobacco-use cessation, weight loss and cognition.  Checklist reviewing preventive services available has been given to the patient.  Reviewed patient's diet, addressing concerns and/or " questions.   The patient's PHQ-9 score is consistent with mild depression. She was provided with information regarding depression.       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  Works out 3 days a week with a .    -Requesting a JATIN letter for her 2 dogs for her apartment building.  History of depression for which she takes Zoloft as well as Wellbutrin.  Tolerating these medications well.    -On OCP.  No concern for STIs.  Up-to-date on Pap.    Health Care Directive  Patient does not have a Health Care Directive or Living Will: Discussed advance care planning with patient; however, patient declined at this time.            3/22/2024   General Health   How would you rate your overall physical health? Good   Feel stress (tense, anxious, or unable to sleep) Very much   (!) STRESS CONCERN      3/22/2024   Nutrition   Three or more servings of calcium each day? (!) NO   Diet: Regular (no restrictions)   How many servings of fruit and vegetables per day? (!) 0-1   How many sweetened beverages each day? 0-1         3/22/2024   Exercise   Days per week of moderate/strenous exercise 5 days   Average minutes spent exercising at this level 40 min         3/22/2024   Social Factors   Frequency of gathering with friends or relatives Once a week   Worry food won't last until get money to buy more Patient declined   Food not last or not have enough money for food? Patient declined   Do you have housing?  Yes   Are you worried about losing your housing? No   Lack of transportation? No   Unable to get utilities (heat,electricity)? No         3/22/2024   Dental   Dentist two times every year? Yes         3/22/2024   TB Screening   Were you born outside of the [place]? No       Today's PHQ-9 Score:       3/27/2024     9:41 AM   PHQ-9 SCORE   PHQ-9  Total Score MyChart 5 (Mild depression)   PHQ-9 Total Score 5         3/22/2024   Substance Use   Alcohol more than 3/day or more than 7/wk Not Applicable   Do you use any other substances recreationally? (!) CANNABIS PRODUCTS     Social History     Tobacco Use    Smoking status: Never     Passive exposure: Never    Smokeless tobacco: Never   Vaping Use    Vaping Use: Never used   Substance Use Topics    Alcohol use: Not Currently     Comment: rare    Drug use: Yes     Types: Marijuana             3/22/2024   Breast Cancer Screening   Family history of breast, colon, or ovarian cancer? No / Unknown            3/22/2024   STI Screening   New sexual partner(s) since last STI/HIV test? (!) YES     History of abnormal Pap smear:         2022     8:27 AM   PAP / HPV   PAP Negative for Intraepithelial Lesion or Malignancy (NILM)            3/22/2024   Contraception/Family Planning   Questions about contraception or family planning (!) YES        Reviewed and updated as needed this visit by Provider   Tobacco  Allergies  Meds   Med Hx  Surg Hx  Fam Hx            Past Medical History:   Diagnosis Date    Depressive disorder 2016     Past Surgical History:   Procedure Laterality Date    NO PAST SURGERIES       OB History    Para Term  AB Living   2 0 0 0 1 0   SAB IAB Ectopic Multiple Live Births   1 0 0 0 0      # Outcome Date GA Lbr Merritt/2nd Weight Sex Delivery Anes PTL Lv   2 SAB 22 10w0d          1               Lab work is in process  Labs reviewed in EPIC  BP Readings from Last 3 Encounters:   24 130/79   23 116/74   22 120/70    Wt Readings from Last 3 Encounters:   24 112 kg (247 lb)   23 120 kg (264 lb 8 oz)   22 125.6 kg (277 lb)                  Patient Active Problem List   Diagnosis    Moderate episode of recurrent major depressive disorder (H)    PMDD (premenstrual dysphoric disorder)    Obesity (H)     Past Surgical History:   Procedure  "Laterality Date    NO PAST SURGERIES         Social History     Tobacco Use    Smoking status: Never     Passive exposure: Never    Smokeless tobacco: Never   Substance Use Topics    Alcohol use: Not Currently     Comment: rare     Family History   Problem Relation Age of Onset    Cholelithiasis Mother     Diabetes Father     Hypertension Father     Heart Disease Maternal Grandfather          Current Outpatient Medications   Medication Sig Dispense Refill    buPROPion (WELLBUTRIN XL) 300 MG 24 hr tablet Take 1 tablet (300 mg) by mouth daily 90 tablet 3    levonorgestrel-ethinyl estradiol (AVIANE) 0.1-20 MG-MCG tablet Take 1 tablet by mouth daily 84 tablet 3    sertraline (ZOLOFT) 100 MG tablet TAKE 1 TABLET BY MOUTH EVERY DAY 90 tablet 3    fluticasone (FLONASE) 50 MCG/ACT nasal spray Spray 1 spray into both nostrils daily (Patient not taking: Reported on 3/27/2024) 16 g 1     No Known Allergies      Review of Systems  Constitutional, neuro, ENT, endocrine, pulmonary, cardiac, gastrointestinal, genitourinary, musculoskeletal, integument and psychiatric systems are negative, except as otherwise noted.     Objective    Exam  /79 (BP Location: Right arm, Patient Position: Sitting, Cuff Size: Adult Regular)   Pulse 76   Temp 98.1  F (36.7  C)   Resp 16   Ht 1.721 m (5' 7.76\")   Wt 112 kg (247 lb)   LMP 02/29/2024 (Exact Date)   SpO2 97%   BMI 37.83 kg/m     Estimated body mass index is 37.83 kg/m  as calculated from the following:    Height as of this encounter: 1.721 m (5' 7.76\").    Weight as of this encounter: 112 kg (247 lb).    Physical Exam  GENERAL: alert and no distress  EYES: Eyes grossly normal to inspection, PERRL and conjunctivae and sclerae normal  HENT: ear canals and TM's normal, nose and mouth without ulcers or lesions  NECK: no adenopathy, no asymmetry, masses, or scars  RESP: lungs clear to auscultation - no rales, rhonchi or wheezes  CV: regular rate and rhythm, normal S1 S2, no S3 or " S4, no murmur, click or rub, no peripheral edema  ABDOMEN: soft, nontender, no hepatosplenomegaly, no masses and bowel sounds normal  MS: no gross musculoskeletal defects noted, no edema  SKIN: no suspicious lesions or rashes  NEURO: Normal strength and tone, mentation intact and speech normal  PSYCH: mentation appears normal, affect normal/bright    The likelihood of other entities in the differential is insufficient to justify any further testing for them at this time. This was explained to the patient. The patient was advised that persistent or worsening symptoms would require further evaluation. Patient advised to call the office and if unable to reach to go to the emergency room if they develop any new or worsening symptoms. Expressed understanding and agreement with above stated plan.     Signed Electronically by: Sergei Chavez PA-C

## 2024-03-27 NOTE — LETTER
March 27, 2024      Abeba Toro  [address] [address]    To Whom It May Concern:    I am writing this letter as a professional recommendation for my patient,  Abeba Toro to have two Emotional Support Animals (ESAs) in her apartment. I am a licensed PA-C and Abeba is under my care.    She has been diagnosed with depression, which significantly limits her ability to perform major life activities. As part of her ongoing treatment, I have recommended that she obtains two Emotional Support Animals. These animals play an integral role in her emotional well-being, providing comfort, companionship, and mitigating the symptoms of her condition.    Under the Fair Housing Act (FHA) and the Rehabilitation Act of 1973, individuals with disabilities have the right to keep Emotional Support Animals, even in housing communities where pets are typically not allowed. These federal laws also state that reasonable accommodations must be made for individuals with disabilities, such as waiving pet fees or restrictions on the number of pets allowed.    The presence of these Emotional Support Animals is a necessary part of the treatment program for Abeba and I believe that their presence will greatly improve her mental health and quality of life.    Please note that no specific training is required for animals to be considered as Emotional Support Animals, and their primary role is to provide emotional comfort and companionship.    I kindly request that you make the necessary accommodations for Abeba to have her Emotional Support Animals in her apartment. This accommodation is essential for her wellbeing and recovery.    Thank you for your understanding and cooperation. If you have any questions or if you need further information, please feel free to contact me at the above contact information.    Sincerely,    ANAMARIA Okeefe

## 2024-03-28 NOTE — RESULT ENCOUNTER NOTE
Dick Us,     -Stable blood counts. No signs of anemia.     -Your comprehensive metabolic panel which includes tests of liver function (ALT, AST, total bilirubin, alkaline phosphatase), kidney function (creatinine, BUN), electrolytes (sodium, potassium, calcium), and blood sugar (glucose) returned stable for you.    -A1c shows no signs of diabetes or pre-diabetes    -TSH (thyroid stimulating hormone) level is normal which indicates normal circulating thyroid hormone levels.     Let me know if you have any questions or concerns,     Sergei Chavez PA-C  [facility]

## 2024-04-28 DIAGNOSIS — Z30.011 ENCOUNTER FOR INITIAL PRESCRIPTION OF CONTRACEPTIVE PILLS: ICD-10-CM

## 2024-04-29 RX ORDER — TIMOLOL MALEATE 5 MG/ML
1 SOLUTION/ DROPS OPHTHALMIC DAILY
Qty: 84 TABLET | Refills: 2 | Status: SHIPPED | OUTPATIENT
Start: 2024-04-29

## 2024-05-12 ENCOUNTER — NURSE TRIAGE (OUTPATIENT)
Dept: NURSING | Facility: CLINIC | Age: 29
End: 2024-05-12
Payer: COMMERCIAL

## 2024-05-12 NOTE — TELEPHONE ENCOUNTER
Caller:   Patient    Situation:   Asking if [facility] does abortions        Background:        Assessment:  [facility] for   This writer does not know if [facility] does it so she may need to have an appt w/ a OB provider to discuss this        Recommendation:  Disposition: Informed patient of the message above; advised trying [facility]     Patient verbalized understanding of care advice.        Pino Wyatt RN, BSN  Triage Nurse Advisor      Reason for Disposition    [1] Caller requesting NON-URGENT health information AND [2] PCP's office is the best resource    Protocols used: Information Only Call - No Triage-A-

## 2024-10-30 DIAGNOSIS — Z76.0 ENCOUNTER FOR MEDICATION REFILL: ICD-10-CM

## 2024-10-30 DIAGNOSIS — F33.1 MODERATE EPISODE OF RECURRENT MAJOR DEPRESSIVE DISORDER (H): ICD-10-CM

## 2024-10-31 RX ORDER — BUPROPION HYDROCHLORIDE 300 MG/1
300 TABLET ORAL DAILY
Qty: 90 TABLET | Refills: 2 | Status: SHIPPED | OUTPATIENT
Start: 2024-10-31

## 2025-01-08 ENCOUNTER — OFFICE VISIT (OUTPATIENT)
Dept: FAMILY MEDICINE | Facility: CLINIC | Age: 30
End: 2025-01-08
Payer: COMMERCIAL

## 2025-01-08 VITALS
BODY MASS INDEX: 36.22 KG/M2 | SYSTOLIC BLOOD PRESSURE: 109 MMHG | OXYGEN SATURATION: 96 % | DIASTOLIC BLOOD PRESSURE: 73 MMHG | WEIGHT: 239 LBS | RESPIRATION RATE: 18 BRPM | HEIGHT: 68 IN | HEART RATE: 80 BPM | TEMPERATURE: 97.8 F

## 2025-01-08 DIAGNOSIS — E66.01 MORBID OBESITY (H): ICD-10-CM

## 2025-01-08 DIAGNOSIS — N92.6 MISSED MENSES: ICD-10-CM

## 2025-01-08 DIAGNOSIS — N76.0 BV (BACTERIAL VAGINOSIS): ICD-10-CM

## 2025-01-08 DIAGNOSIS — N89.8 VAGINAL ODOR: ICD-10-CM

## 2025-01-08 DIAGNOSIS — A59.01 TRICHOMONAL VAGINITIS: Primary | ICD-10-CM

## 2025-01-08 DIAGNOSIS — Z12.4 CERVICAL CANCER SCREENING: ICD-10-CM

## 2025-01-08 DIAGNOSIS — Z11.3 SCREENING FOR STD (SEXUALLY TRANSMITTED DISEASE): ICD-10-CM

## 2025-01-08 DIAGNOSIS — B96.89 BV (BACTERIAL VAGINOSIS): ICD-10-CM

## 2025-01-08 DIAGNOSIS — F33.1 MODERATE EPISODE OF RECURRENT MAJOR DEPRESSIVE DISORDER (H): ICD-10-CM

## 2025-01-08 PROBLEM — B00.9 HSV-2 (HERPES SIMPLEX VIRUS 2) INFECTION: Status: ACTIVE | Noted: 2025-01-08

## 2025-01-08 LAB
CLUE CELLS: PRESENT
HCG UR QL: NEGATIVE
HIV 1+2 AB+HIV1 P24 AG SERPL QL IA: NONREACTIVE
T PALLIDUM AB SER QL: NONREACTIVE
TRICHOMONAS, WET PREP: PRESENT
WBC'S/HIGH POWER FIELD, WET PREP: ABNORMAL
YEAST, WET PREP: ABNORMAL

## 2025-01-08 PROCEDURE — 87210 SMEAR WET MOUNT SALINE/INK: CPT | Performed by: PHYSICIAN ASSISTANT

## 2025-01-08 PROCEDURE — 87389 HIV-1 AG W/HIV-1&-2 AB AG IA: CPT | Performed by: PHYSICIAN ASSISTANT

## 2025-01-08 PROCEDURE — 36415 COLL VENOUS BLD VENIPUNCTURE: CPT | Performed by: PHYSICIAN ASSISTANT

## 2025-01-08 PROCEDURE — 81025 URINE PREGNANCY TEST: CPT | Performed by: PHYSICIAN ASSISTANT

## 2025-01-08 PROCEDURE — 86780 TREPONEMA PALLIDUM: CPT | Performed by: PHYSICIAN ASSISTANT

## 2025-01-08 RX ORDER — METRONIDAZOLE 500 MG/1
500 TABLET ORAL 2 TIMES DAILY
Qty: 14 TABLET | Refills: 0 | Status: SHIPPED | OUTPATIENT
Start: 2025-01-08 | End: 2025-01-15

## 2025-01-08 ASSESSMENT — PATIENT HEALTH QUESTIONNAIRE - PHQ9
10. IF YOU CHECKED OFF ANY PROBLEMS, HOW DIFFICULT HAVE THESE PROBLEMS MADE IT FOR YOU TO DO YOUR WORK, TAKE CARE OF THINGS AT HOME, OR GET ALONG WITH OTHER PEOPLE: SOMEWHAT DIFFICULT
SUM OF ALL RESPONSES TO PHQ QUESTIONS 1-9: 12
SUM OF ALL RESPONSES TO PHQ QUESTIONS 1-9: 12

## 2025-01-08 ASSESSMENT — PAIN SCALES - GENERAL: PAINLEVEL_OUTOF10: MILD PAIN (2)

## 2025-01-08 NOTE — PROGRESS NOTES
"  Assessment & Plan     Trichomonal vaginitis  wet prep results showed positive for trichomonas. Trichomonas is a nonviral sexually transmitted infection (STI).  I recommend treating both of these with Flagyl 500mg twice daily for 7 days. Don't drink alcohol while on this medication as it can make you sick (abdominal pain, cramps, headaches, flushing). Avoid using scented soaps, douching or putting liquids inside the vagina while washing and clean any sex toys to help prevent from this from recurring.    We recommend retesting for trichomonas 4 weeks after treatment is completed.   - metroNIDAZOLE (FLAGYL) 500 MG tablet; Take 1 tablet (500 mg) by mouth 2 times daily for 7 days.    BV (bacterial vaginosis)  Wet prep positive also for BV. Treating with Flagyl as well. Side effects discussed.   - metroNIDAZOLE (FLAGYL) 500 MG tablet; Take 1 tablet (500 mg) by mouth 2 times daily for 7 days.    Vaginal odor  - Wet prep - Clinic Collect    Screening for STD (sexually transmitted disease)  Results pending  - HIV Antigen Antibody Combo; Future  - Treponema Abs w Reflex to RPR and Titer; Future  - Chlamydia trachomatis/Neisseria gonorrhoeae by PCR - Clinic Collect  - HIV Antigen Antibody Combo  - Treponema Abs w Reflex to RPR and Titer    Missed menses  HCG is negatve  - HCG qualitative urine; Future  - HCG qualitative urine    Morbid obesity (H)  Diet and exercise encouraged    Moderate episode of recurrent major depressive disorder (H)  Chronic, manges with Wellbutrin and sertraline. Declined refills today    Cervical cancer screening  - Pap Screen Reflex to HPV if ASCUS - Recommended Age 25 - 29 Years          BMI  Estimated body mass index is 36.52 kg/m  as calculated from the following:    Height as of this encounter: 1.723 m (5' 7.84\").    Weight as of this encounter: 108.4 kg (239 lb).             Ney Us is a 29 year old, presenting for the following health issues:  STD and Vaginal Problem      Vaginal " "Symptoms    Onset/Duration: 10/1/2024 approximately.   Description:  Vaginal Discharge: creamy   Itching (Pruritis): YES  Burning sensation:  No  Odor: YES   Accompanying Signs & Symptoms:  Urinary symptoms: No  Abdominal pain: No  Fever: No  History:   Sexually active: YES - pain with sex  New Partner: No  Possibility of Pregnancy:  YES- possible and would like to get tested for HCG. Also requesting for BV and STD testing.   Recent antibiotic use: No  Previous vaginitis issues: YES  Therapies tried and outcome: Boric acid and Monistat             Review of Systems  Constitutional, HEENT, cardiovascular, pulmonary, gi and gu systems are negative, except as otherwise noted.      Objective    /73   Pulse 80   Temp 97.8  F (36.6  C) (Oral)   Resp 18   Ht 1.723 m (5' 7.84\")   Wt 108.4 kg (239 lb)   LMP 12/27/2024 (Approximate)   SpO2 96%   BMI 36.52 kg/m    Body mass index is 36.52 kg/m .  Physical Exam   GENERAL: alert and no distress   (female): normal female external genitalia, normal urethral meatus , vaginal discharge - moderate, thin, watery, frothy, and malodorous, vaginal skin findings: inflamed tissue, and normal cervix, adnexae, and uterus without masses.    Results for orders placed or performed in visit on 01/08/25 (from the past 24 hours)   HCG qualitative urine   Result Value Ref Range    hCG Urine Qualitative Negative Negative   Wet prep - Clinic Collect    Specimen: Vagina; Swab   Result Value Ref Range    Trichomonas Present (A) Absent    Yeast Absent Absent    Clue Cells Present (A) Absent    WBCs/high power field 2+ (A) None           Signed Electronically by: ANAMARIA Ivy    "

## 2025-01-09 LAB
C TRACH DNA SPEC QL PROBE+SIG AMP: NEGATIVE
N GONORRHOEA DNA SPEC QL NAA+PROBE: NEGATIVE
SPECIMEN TYPE: NORMAL

## 2025-01-13 LAB
BKR LAB AP GYN ADEQUACY: NORMAL
BKR LAB AP GYN INTERPRETATION: NORMAL
BKR LAB AP GYN OTHER FINDINGS: NORMAL
BKR LAB AP HPV REFLEX: NORMAL
BKR LAB AP LMP: NORMAL
BKR LAB AP PREVIOUS ABNORMAL: NORMAL
PATH REPORT.COMMENTS IMP SPEC: NORMAL
PATH REPORT.COMMENTS IMP SPEC: NORMAL
PATH REPORT.RELEVANT HX SPEC: NORMAL

## 2025-01-19 ENCOUNTER — MYC MEDICAL ADVICE (OUTPATIENT)
Dept: FAMILY MEDICINE | Facility: CLINIC | Age: 30
End: 2025-01-19
Payer: COMMERCIAL

## 2025-01-19 ENCOUNTER — MYC REFILL (OUTPATIENT)
Dept: FAMILY MEDICINE | Facility: CLINIC | Age: 30
End: 2025-01-19
Payer: COMMERCIAL

## 2025-01-19 DIAGNOSIS — A59.01 TRICHOMONAL VAGINITIS: ICD-10-CM

## 2025-01-19 DIAGNOSIS — N76.0 BV (BACTERIAL VAGINOSIS): ICD-10-CM

## 2025-01-19 DIAGNOSIS — B96.89 BV (BACTERIAL VAGINOSIS): ICD-10-CM

## 2025-01-20 RX ORDER — METRONIDAZOLE 500 MG/1
500 TABLET ORAL 2 TIMES DAILY
Qty: 14 TABLET | Refills: 0 | OUTPATIENT
Start: 2025-01-20

## 2025-01-20 NOTE — TELEPHONE ENCOUNTER
I would recommend coming in for an evaluation and likely retesting prior to another round of treatment.      Thank you,  Airam Weinstein PA-C

## 2025-01-20 NOTE — TELEPHONE ENCOUNTER
Forwarding to provider to advise. Note that pt was seen 1/8/25 and treated for bacterial vaginosis and trichomonas.    Denia Jamil RN

## 2025-01-30 ENCOUNTER — OFFICE VISIT (OUTPATIENT)
Dept: FAMILY MEDICINE | Facility: CLINIC | Age: 30
End: 2025-01-30
Payer: COMMERCIAL

## 2025-01-30 VITALS
RESPIRATION RATE: 18 BRPM | OXYGEN SATURATION: 97 % | WEIGHT: 239.6 LBS | DIASTOLIC BLOOD PRESSURE: 85 MMHG | HEIGHT: 69 IN | HEART RATE: 91 BPM | SYSTOLIC BLOOD PRESSURE: 123 MMHG | BODY MASS INDEX: 35.49 KG/M2 | TEMPERATURE: 98.5 F

## 2025-01-30 DIAGNOSIS — A59.09 TRICHOMONAL CERVICITIS: ICD-10-CM

## 2025-01-30 DIAGNOSIS — N89.8 VAGINAL DISCHARGE: Primary | ICD-10-CM

## 2025-01-30 DIAGNOSIS — B96.89 BV (BACTERIAL VAGINOSIS): ICD-10-CM

## 2025-01-30 DIAGNOSIS — N76.0 BV (BACTERIAL VAGINOSIS): ICD-10-CM

## 2025-01-30 LAB
CLUE CELLS: PRESENT
TRICHOMONAS, WET PREP: PRESENT
WBC'S/HIGH POWER FIELD, WET PREP: ABNORMAL
YEAST, WET PREP: ABNORMAL

## 2025-01-30 RX ORDER — METRONIDAZOLE 500 MG/1
TABLET ORAL
Qty: 28 TABLET | Refills: 0 | Status: SHIPPED | OUTPATIENT
Start: 2025-01-30

## 2025-01-30 ASSESSMENT — ANXIETY QUESTIONNAIRES
GAD7 TOTAL SCORE: 7
2. NOT BEING ABLE TO STOP OR CONTROL WORRYING: SEVERAL DAYS
1. FEELING NERVOUS, ANXIOUS, OR ON EDGE: SEVERAL DAYS
7. FEELING AFRAID AS IF SOMETHING AWFUL MIGHT HAPPEN: SEVERAL DAYS
8. IF YOU CHECKED OFF ANY PROBLEMS, HOW DIFFICULT HAVE THESE MADE IT FOR YOU TO DO YOUR WORK, TAKE CARE OF THINGS AT HOME, OR GET ALONG WITH OTHER PEOPLE?: SOMEWHAT DIFFICULT
6. BECOMING EASILY ANNOYED OR IRRITABLE: SEVERAL DAYS
4. TROUBLE RELAXING: SEVERAL DAYS
3. WORRYING TOO MUCH ABOUT DIFFERENT THINGS: SEVERAL DAYS
7. FEELING AFRAID AS IF SOMETHING AWFUL MIGHT HAPPEN: SEVERAL DAYS
GAD7 TOTAL SCORE: 7
5. BEING SO RESTLESS THAT IT IS HARD TO SIT STILL: SEVERAL DAYS
GAD7 TOTAL SCORE: 7
IF YOU CHECKED OFF ANY PROBLEMS ON THIS QUESTIONNAIRE, HOW DIFFICULT HAVE THESE PROBLEMS MADE IT FOR YOU TO DO YOUR WORK, TAKE CARE OF THINGS AT HOME, OR GET ALONG WITH OTHER PEOPLE: SOMEWHAT DIFFICULT

## 2025-01-30 ASSESSMENT — PAIN SCALES - GENERAL: PAINLEVEL_OUTOF10: NO PAIN (0)

## 2025-01-30 NOTE — PROGRESS NOTES
"  Assessment & Plan     Vaginal discharge  - Wet prep - Clinic Collect    BV (bacterial vaginosis)  Patients wet prep is positive for recurrent BV and trich. Due to this reinfection will treat with higher dosage of Metronidazole, according to UptoDate patient should take 2g once a day for 7 days. Follow up in 4 weeks to recheck on both. Patient agree's with this plan and has no further questions  - metroNIDAZOLE (FLAGYL) 500 MG tablet; Take 4 tablets once a day for 7 days    Trichomonal cervicitis  See plan above  - metroNIDAZOLE (FLAGYL) 500 MG tablet; Take 4 tablets once a day for 7 days          BMI  Estimated body mass index is 35.9 kg/m  as calculated from the following:    Height as of this encounter: 1.74 m (5' 8.5\").    Weight as of this encounter: 108.7 kg (239 lb 9.6 oz).             Ney Us is a 29 year old, presenting for the following health issues:  Vaginal Problem (Odor and discharge x1week)        1/30/2025     1:59 PM   Additional Questions   Roomed by Mary CORREA MA     History of Present Illness       Mental Health Follow-up:  Patient presents to follow-up on Depression & Anxiety.Patient's depression since last visit has been:  Medium  The patient is not having other symptoms associated with depression.  Patient's anxiety since last visit has been:  No change  The patient is not having other symptoms associated with anxiety.  Any significant life events: financial concerns and housing concerns  Patient is not feeling anxious or having panic attacks.  Patient has no concerns about alcohol or drug use.    She eats 0-1 servings of fruits and vegetables daily.She consumes 1 sweetened beverage(s) daily.She exercises with enough effort to increase her heart rate 10 to 19 minutes per day.  She exercises with enough effort to increase her heart rate 5 days per week. She is missing 2 dose(s) of medications per week.         Vaginal Symptoms  Additional provider notes :   Patient was having " "vaginal symptoms of odor, discharge and pain back in early January. She was diagnosis with trich and BV and treated with Flagyl. She felt better on medication but 3 days after completing medication symptoms returned. They are less and without pain this time but still has odor and discharge. She has not had sex or used any products in the vaginal area.   Onset/Duration: 1/23/2025  Description:  Vaginal Discharge: creamy   Itching (Pruritis): YES  Burning sensation:  No  Odor: YES  Accompanying Signs & Symptoms:  Urinary symptoms: No  Abdominal pain: No  Fever: No  History:   Sexually active: YES  New Partner: No  Possibility of Pregnancy:  No  Recent antibiotic use: metronidazole  Previous vaginitis issues: YES- was diagnosed with bv 1/8/2025  Precipitating or alleviating factors: None  Therapies tried and outcome: none and medication given metronidazole        Review of Systems  Constitutional, HEENT, cardiovascular, pulmonary, gi and gu systems are negative, except as otherwise noted.      Objective    /85 (BP Location: Right arm, Patient Position: Sitting, Cuff Size: Adult Large)   Pulse 91   Temp 98.5  F (36.9  C) (Oral)   Resp 18   Ht 1.74 m (5' 8.5\")   Wt 108.7 kg (239 lb 9.6 oz)   LMP 01/23/2025 (Approximate)   SpO2 97%   BMI 35.90 kg/m    Body mass index is 35.9 kg/m .  Physical Exam   GENERAL: alert and no distress   (female): normal female external genitalia, normal urethral meatus , inflamed vaginal mucosa, vaginal discharge - white, watery, milky, and malodorous, and normal cervix    Results for orders placed or performed in visit on 01/30/25   Wet prep - Clinic Collect     Status: Abnormal    Specimen: Vagina; Swab   Result Value Ref Range    Trichomonas Present (A) Absent    Yeast Absent Absent    Clue Cells Present (A) Absent    WBCs/high power field 3+ (A) None           Signed Electronically by: ANAMARIA Ivy    "

## 2025-02-06 DIAGNOSIS — F33.1 MODERATE EPISODE OF RECURRENT MAJOR DEPRESSIVE DISORDER (H): ICD-10-CM

## 2025-02-06 RX ORDER — SERTRALINE HYDROCHLORIDE 100 MG/1
100 TABLET, FILM COATED ORAL DAILY
Qty: 90 TABLET | Refills: 0 | Status: SHIPPED | OUTPATIENT
Start: 2025-02-06

## 2025-02-25 ENCOUNTER — PATIENT OUTREACH (OUTPATIENT)
Dept: CARE COORDINATION | Facility: CLINIC | Age: 30
End: 2025-02-25
Payer: COMMERCIAL

## 2025-03-04 ENCOUNTER — OFFICE VISIT (OUTPATIENT)
Dept: FAMILY MEDICINE | Facility: CLINIC | Age: 30
End: 2025-03-04
Payer: COMMERCIAL

## 2025-03-04 VITALS
WEIGHT: 241 LBS | DIASTOLIC BLOOD PRESSURE: 74 MMHG | HEART RATE: 88 BPM | SYSTOLIC BLOOD PRESSURE: 116 MMHG | TEMPERATURE: 98.4 F | BODY MASS INDEX: 35.7 KG/M2 | RESPIRATION RATE: 16 BRPM | HEIGHT: 69 IN

## 2025-03-04 DIAGNOSIS — B96.89 BV (BACTERIAL VAGINOSIS): ICD-10-CM

## 2025-03-04 DIAGNOSIS — A59.01 TRICHOMONAL VAGINITIS: Primary | ICD-10-CM

## 2025-03-04 DIAGNOSIS — N76.0 BV (BACTERIAL VAGINOSIS): ICD-10-CM

## 2025-03-04 LAB
CLUE CELLS: PRESENT
TRICHOMONAS, WET PREP: ABNORMAL
WBC'S/HIGH POWER FIELD, WET PREP: ABNORMAL
YEAST, WET PREP: ABNORMAL

## 2025-03-04 PROCEDURE — 3078F DIAST BP <80 MM HG: CPT | Performed by: PHYSICIAN ASSISTANT

## 2025-03-04 PROCEDURE — 87210 SMEAR WET MOUNT SALINE/INK: CPT | Performed by: PHYSICIAN ASSISTANT

## 2025-03-04 PROCEDURE — 3074F SYST BP LT 130 MM HG: CPT | Performed by: PHYSICIAN ASSISTANT

## 2025-03-04 PROCEDURE — 1126F AMNT PAIN NOTED NONE PRSNT: CPT | Performed by: PHYSICIAN ASSISTANT

## 2025-03-04 PROCEDURE — 99213 OFFICE O/P EST LOW 20 MIN: CPT | Performed by: PHYSICIAN ASSISTANT

## 2025-03-04 RX ORDER — CLINDAMYCIN PHOSPHATE 20 MG/G
1 CREAM VAGINAL AT BEDTIME
Qty: 40 G | Refills: 0 | Status: SHIPPED | OUTPATIENT
Start: 2025-03-04

## 2025-03-04 ASSESSMENT — PAIN SCALES - GENERAL: PAINLEVEL_OUTOF10: NO PAIN (0)

## 2025-03-04 NOTE — PROGRESS NOTES
"  Assessment & Plan     Trichomonal vaginitis  Patients wet prep is clear for trichomonas. No further treatment needed.  - Wet prep - Clinic Collect    BV (bacterial vaginosis)  Wet prep clue cells continue to be positive, indicating recurrent BV. Next treatment options for recurrent BV is clindamycin vaginal cream 2% at bedtime for 7 nights. I have sent this in. If patient remains asymptomatic then no follow up is needed   - Wet prep - Clinic Collect  - clindamycin (CLEOCIN) 2 % vaginal cream; Place 1 applicator vaginally at bedtime. For 7 nights          BMI  Estimated body mass index is 35.9 kg/m  as calculated from the following:    Height as of this encounter: 1.745 m (5' 8.7\").    Weight as of this encounter: 109.3 kg (241 lb).             Ney Us is a 29 year old, presenting for the following health issues:  Vaginal Problem    History of Present Illness       Reason for visit:  BV    She eats 0-1 servings of fruits and vegetables daily.She consumes 2 sweetened beverage(s) daily.She exercises with enough effort to increase her heart rate 20 to 29 minutes per day.  She exercises with enough effort to increase her heart rate 4 days per week. She is missing 2 dose(s) of medications per week.  She is not taking prescribed medications regularly due to remembering to take.          Vaginal Symptoms  Onset/Duration: Follow up   Description:  Vaginal Discharge: none   Itching (Pruritis): No  Burning sensation:  No  Odor: No  Accompanying Signs & Symptoms:  Urinary symptoms: No  Abdominal pain: No  Fever: No  Therapies tried and outcome: Flagyl (metronidazole); patient reported symptoms have resolved.             Review of Systems  Constitutional, HEENT, cardiovascular, pulmonary, gi and gu systems are negative, except as otherwise noted.      Objective    /74   Pulse 88   Temp 98.4  F (36.9  C) (Oral)   Resp 16   Ht 1.745 m (5' 8.7\")   Wt 109.3 kg (241 lb)   LMP 01/23/2025 (Approximate)   " BMI 35.90 kg/m    Body mass index is 35.9 kg/m .  Physical Exam   GENERAL: alert and no distress  PSYCH: mentation appears normal, affect normal/bright    Results for orders placed or performed in visit on 03/04/25   Wet prep - Clinic Collect     Status: Abnormal    Specimen: Vagina; Swab   Result Value Ref Range    Trichomonas Absent Absent    Yeast Absent Absent    Clue Cells Present (A) Absent    WBCs/high power field 3+ (A) None           Signed Electronically by: ANAMARIA Ivy

## 2025-03-11 ENCOUNTER — PATIENT OUTREACH (OUTPATIENT)
Dept: CARE COORDINATION | Facility: CLINIC | Age: 30
End: 2025-03-11
Payer: COMMERCIAL

## 2025-03-20 DIAGNOSIS — Z30.011 ENCOUNTER FOR INITIAL PRESCRIPTION OF CONTRACEPTIVE PILLS: ICD-10-CM

## 2025-03-20 RX ORDER — TIMOLOL MALEATE 5 MG/ML
1 SOLUTION/ DROPS OPHTHALMIC
Qty: 84 TABLET | Refills: 2 | Status: SHIPPED | OUTPATIENT
Start: 2025-03-20

## 2025-05-09 NOTE — PROGRESS NOTES
Abeba Toro is a 25 y.o. female who is being evaluated via a billable video visit.      How would you like to obtain your AVS? MyChart.  If dropped from the video visit, the video invitation should be resent by: Text to cell phone: [phone]   Will anyone else be joining your video visit? No      Video Start Time: 8:35P    1. Moderate episode of recurrent major depressive disorder (H)  Depression symptoms significantly improved with increase in bupropion dose.  She is feeling more like herself without significant depressed mood or anhedonia.  Sleeping better as well.  Beneficial for libido.  Will plan to keep stable at bupropion 300mg and sertraline 50mg and follow up six months.    - buPROPion (WELLBUTRIN XL) 300 MG 24 hr tablet; Take 1 tablet (300 mg total) by mouth daily.  Dispense: 90 tablet; Refill: 3  - sertraline (ZOLOFT) 50 MG tablet; Take 1 tablet (50 mg total) by mouth daily.  Dispense: 90 tablet; Refill: 3    Subjective   Abeba Toro is 25 y.o. and presents today for the following health issues   HPI     She is following up today for depression.  Bupropion added on to sertraline for persistent depression symptoms and decreased libido.  This helped with libido though not quite as helpful for depression.  Last visit increased dose to 300mg.  She states this has helped quite a bit with energy levels and mood.  Feels 'like myself again'.  Interest in doing things and actually able to go out and do them.  Sleeping better.  No difficulties with insomnia. No other side effects noted.      Review of Systems  Negative unless otherwise noted in HPI      Objective       Vitals:  No vitals were obtained today due to virtual visit.    Physical Exam  Alert, well appearing.  Pleasant mood,affect appropriate.  Good judgment and insight.     Video-Visit Details    Type of service:  Video Visit    Video End Time (time video stopped): 8:44 AM  Originating Location (pt. Location): Home    Distant Location  (provider location):  [facility]     Platform used for Video Visit: Carlos Alberto     none

## 2025-05-17 ENCOUNTER — HEALTH MAINTENANCE LETTER (OUTPATIENT)
Age: 30
End: 2025-05-17

## 2025-07-23 ENCOUNTER — VIRTUAL VISIT (OUTPATIENT)
Dept: FAMILY MEDICINE | Facility: CLINIC | Age: 30
End: 2025-07-23
Payer: COMMERCIAL

## 2025-07-23 DIAGNOSIS — F33.1 MODERATE EPISODE OF RECURRENT MAJOR DEPRESSIVE DISORDER (H): ICD-10-CM

## 2025-07-23 DIAGNOSIS — Z76.0 ENCOUNTER FOR MEDICATION REFILL: ICD-10-CM

## 2025-07-23 PROCEDURE — 1126F AMNT PAIN NOTED NONE PRSNT: CPT | Performed by: PHYSICIAN ASSISTANT

## 2025-07-23 PROCEDURE — 98005 SYNCH AUDIO-VIDEO EST LOW 20: CPT | Performed by: PHYSICIAN ASSISTANT

## 2025-07-23 RX ORDER — BUPROPION HYDROCHLORIDE 300 MG/1
300 TABLET ORAL DAILY
Qty: 90 TABLET | Refills: 3 | Status: SHIPPED | OUTPATIENT
Start: 2025-07-23

## 2025-07-23 RX ORDER — SERTRALINE HYDROCHLORIDE 100 MG/1
100 TABLET, FILM COATED ORAL DAILY
Qty: 90 TABLET | Refills: 3 | Status: SHIPPED | OUTPATIENT
Start: 2025-07-23

## 2025-07-23 ASSESSMENT — PATIENT HEALTH QUESTIONNAIRE - PHQ9: SUM OF ALL RESPONSES TO PHQ QUESTIONS 1-9: 3

## 2025-07-23 NOTE — LETTER
2025    Abeba Toro   1995    To Whom It May Concern:     I am writing this letter as a professional recommendation for my patient,  Abeba Toro to have two Emotional Support Animals (ESAs) in her apartment. I am a licensed [employer] and Abeba is under my care.     She has been diagnosed with depression, which significantly limits her ability to perform major life activities. As part of her ongoing treatment, I have recommended that she obtains two Emotional Support Animals. These animals play an integral role in her emotional well-being, providing comfort, companionship, and mitigating the symptoms of her condition.     Under the Fair Housing Act (FHA) and the Rehabilitation Act of , individuals with disabilities have the right to keep Emotional Support Animals, even in housing communities where pets are typically not allowed. These federal laws also state that reasonable accommodations must be made for individuals with disabilities, such as waiving pet fees or restrictions on the number of pets allowed.     The presence of these Emotional Support Animals is a necessary part of the treatment program for Abeba and I believe that their presence will greatly improve her mental health and quality of life.     Please note that no specific training is required for animals to be considered as Emotional Support Animals, and their primary role is to provide emotional comfort and companionship.     I kindly request that you make the necessary accommodations for Abeba to have her Emotional Support Animals in her apartment. This accommodation is essential for her wellbeing and recovery.     Thank you for your understanding and cooperation. If you have any questions or if you need further information, please feel free to contact me at the above contact information.     Sincerely,    Sergei Chavez PA-C

## 2025-07-23 NOTE — PROGRESS NOTES
"Abeba is a 29 year old who is being evaluated via a billable video visit.    How would you like to obtain your AVS? MyChart  If the video visit is dropped, the invitation should be resent by: Text to cell phone: [phone]  Will anyone else be joining your video visit? No      Assessment & Plan     Moderate episode of recurrent major depressive disorder (H)  Signed meds.  Stable on current combination.  Signed JATIN letter.  Follow-up annually however sooner as needed with any issues.  - sertraline (ZOLOFT) 100 MG tablet  Dispense: 90 tablet; Refill: 3  - buPROPion (WELLBUTRIN XL) 300 MG 24 hr tablet  Dispense: 90 tablet; Refill: 3    BMI  Estimated body mass index is 35.9 kg/m  as calculated from the following:    Height as of 3/4/25: 1.745 m (5' 8.7\").    Weight as of 3/4/25: 109.3 kg (241 lb).   Weight management plan: Discussed healthy diet and exercise guidelines    The longitudinal plan of care for the diagnosis(es)/condition(s) as documented were addressed during this visit. Due to the added complexity in care, I will continue to support Abeba in the subsequent management and with ongoing continuity of care.    Subjective   Abeba is a 29 year old, presenting for the following health issues:    Here for medication follow-up + JATIN letter for leasing agency. Signed similar letter in 2024 which worked well. Needs refills on Wellbutrin + Zoloft. Mood stable.     Medication Request and Letter Request (JATIN letter request./ /)        7/23/2025    10:50 AM   Additional Questions   Roomed by Debbi BURKS   Accompanied by N/A     History of Present Illness       Reason for visit:  Medication and JATIN letter request. She is missing 1 dose(s) of medications per week.  She is not taking prescribed medications regularly due to remembering to take.            Review of Systems  Constitutional, neuro, ENT, endocrine, pulmonary, cardiac, gastrointestinal, genitourinary, musculoskeletal, integument and psychiatric systems " "are negative, except as otherwise noted.      Objective    Vitals - Patient Reported  Weight (Patient Reported): 111.1 kg (245 lb)  Height (Patient Reported): 170.2 cm (5' 7\")  BMI (Based on Pt Reported Ht/Wt): 38.37  Pain Score: No Pain (0)        Physical Exam   GENERAL: alert and no distress  EYES: Eyes grossly normal to inspection.  No discharge or erythema, or obvious scleral/conjunctival abnormalities.  RESP: No audible wheeze, cough, or visible cyanosis.    SKIN: Visible skin clear. No significant rash, abnormal pigmentation or lesions.  NEURO: Cranial nerves grossly intact.  Mentation and speech appropriate for age.  PSYCH: Appropriate affect, tone, and pace of words          Video-Visit Details    Type of service:  Video Visit   Originating Location (pt. Location): Home    Distant Location (provider location):  On-site  Platform used for Video Visit: D[employer]imity    The likelihood of other entities in the differential is insufficient to justify any further testing for them at this time. This was explained to the patient. The patient was advised that persistent or worsening symptoms would require further evaluation. Patient advised to call the office and if unable to reach to go to the emergency room if they develop any new or worsening symptoms. Expressed understanding and agreement with above stated plan.     Signed Electronically by: Sergei Chavez PA-C    "